# Patient Record
Sex: FEMALE | Race: WHITE | Employment: FULL TIME | URBAN - METROPOLITAN AREA
[De-identification: names, ages, dates, MRNs, and addresses within clinical notes are randomized per-mention and may not be internally consistent; named-entity substitution may affect disease eponyms.]

---

## 2017-04-06 ENCOUNTER — HOSPITAL ENCOUNTER (OUTPATIENT)
Dept: MAMMOGRAPHY | Age: 42
Discharge: HOME | End: 2017-04-06
Attending: INTERNAL MEDICINE | Admitting: INTERNAL MEDICINE

## 2017-04-06 DIAGNOSIS — Z12.31 VISIT FOR SCREENING MAMMOGRAM: ICD-10-CM

## 2017-07-07 ENCOUNTER — TELEPHONE (OUTPATIENT)
Dept: BARIATRICS/WEIGHT MGMT | Age: 42
End: 2017-07-07

## 2017-08-29 ENCOUNTER — TELEPHONE (OUTPATIENT)
Dept: BARIATRICS/WEIGHT MGMT | Age: 42
End: 2017-08-29

## 2017-09-12 ENCOUNTER — OFFICE VISIT (OUTPATIENT)
Dept: BARIATRICS/WEIGHT MGMT | Age: 42
End: 2017-09-12

## 2017-09-12 VITALS
HEIGHT: 63 IN | SYSTOLIC BLOOD PRESSURE: 127 MMHG | DIASTOLIC BLOOD PRESSURE: 74 MMHG | HEART RATE: 69 BPM | RESPIRATION RATE: 16 BRPM | BODY MASS INDEX: 36.29 KG/M2 | WEIGHT: 204.8 LBS

## 2017-09-12 DIAGNOSIS — K21.9 CHRONIC GERD: ICD-10-CM

## 2017-09-12 DIAGNOSIS — Z01.818 PRE-OPERATIVE CLEARANCE: ICD-10-CM

## 2017-09-12 DIAGNOSIS — F32.A DEPRESSION, UNSPECIFIED DEPRESSION TYPE: ICD-10-CM

## 2017-09-12 PROCEDURE — 99205 OFFICE O/P NEW HI 60 MIN: CPT | Performed by: SURGERY

## 2017-09-12 RX ORDER — CALCIUM CARBONATE 200(500)MG
15 TABLET,CHEWABLE ORAL AS NEEDED
COMMUNITY

## 2017-09-21 ENCOUNTER — TELEPHONE (OUTPATIENT)
Dept: BARIATRICS/WEIGHT MGMT | Age: 42
End: 2017-09-21

## 2017-10-03 ENCOUNTER — TELEPHONE (OUTPATIENT)
Dept: BARIATRICS/WEIGHT MGMT | Age: 42
End: 2017-10-03

## 2017-10-03 NOTE — TELEPHONE ENCOUNTER
Patient called with questions about bill received. States she called the billing office and they advised her to call our office. I will look into why patient has a bill.

## 2017-10-04 ENCOUNTER — TELEPHONE (OUTPATIENT)
Dept: SURGERY | Age: 42
End: 2017-10-04

## 2017-10-04 NOTE — TELEPHONE ENCOUNTER
Patient called again upset about her bill. She would like a call tomorrow after 6:00 pm on her cell [phone].

## 2018-12-01 ENCOUNTER — HOSPITAL ENCOUNTER (OUTPATIENT)
Dept: WOMENS IMAGING | Age: 43
Discharge: HOME | End: 2018-12-01
Payer: COMMERCIAL

## 2018-12-01 DIAGNOSIS — Z12.31 ENCOUNTER FOR SCREENING MAMMOGRAM FOR BREAST CANCER: ICD-10-CM

## 2018-12-01 PROCEDURE — 77067 SCR MAMMO BI INCL CAD: CPT

## 2019-05-06 LAB
BASOPHILS ABSOLUTE: 0.1 K/UL (ref 0–0.2)
BASOPHILS RELATIVE PERCENT: 0.9 %
EOSINOPHILS ABSOLUTE: 0.4 K/UL (ref 0–0.6)
EOSINOPHILS RELATIVE PERCENT: 4.6 %
HCT VFR BLD CALC: 41.1 % (ref 36–48)
HEMOGLOBIN: 13.6 G/DL (ref 12–16)
LYMPHOCYTES ABSOLUTE: 3.6 K/UL (ref 1–5.1)
LYMPHOCYTES RELATIVE PERCENT: 42.5 %
MCH RBC QN AUTO: 29.2 PG (ref 26–34)
MCHC RBC AUTO-ENTMCNC: 33.1 G/DL (ref 31–36)
MCV RBC AUTO: 88 FL (ref 80–100)
MONOCYTES ABSOLUTE: 0.6 K/UL (ref 0–1.3)
MONOCYTES RELATIVE PERCENT: 6.8 %
NEUTROPHILS ABSOLUTE: 3.9 K/UL (ref 1.7–7.7)
NEUTROPHILS RELATIVE PERCENT: 45.2 %
PDW BLD-RTO: 13.5 % (ref 12.4–15.4)
PLATELET # BLD: 238 K/UL (ref 135–450)
PMV BLD AUTO: 10.1 FL (ref 5–10.5)
RBC # BLD: 4.67 M/UL (ref 4–5.2)
WBC # BLD: 8.5 K/UL (ref 4–11)

## 2019-05-06 NOTE — PROGRESS NOTES
[facility] time____0800________        Surgery time__0900__________    Take the following medications with a sip of water:    Do not eat or drink anything after 12:00 midnight prior to your surgery. This includes water chewing gum, mints and ice chips. You may brush your teeth and gargle the morning of your surgery, but do not swallow the water      You may be asked to stop blood thinners such as Coumadin, Plavix, Fragmin, Lovenox, etc., or any anti-inflammatories such as:  Aspirin, Ibuprofen, Advil, Naproxen prior to your surgery. We also ask that you stop any OTC medications such as fish oil, vitamin E, glucosamine, garlic, Multivitamins, COQ 10, etc.    We ask that you do not smoke 24 hours prior to surgery  We ask that you do not  drink any alcoholic beverages 24 hours prior to surgery     You must make arrangements for a responsible adult to take you home after your surgery. For your safety you will not be allowed to leave alone or drive yourself home. Your surgery will be cancelled if you do not have a ride home. Also for your safety, it is strongly suggested that someone stay with you the first 24 hours after your surgery. A parent or legal guardian must accompany a child scheduled for surgery and plan to stay at the hospital until the child is discharged. Please do not bring other children with you. For your comfort, please wear simple loose fitting clothing to the hospital.  Please do not bring valuables. Do not wear any make-up or nail polish on your fingers or toes      For your safety, please do not wear any jewelry or body piercing's on the day of surgery. All jewelry must be removed. If you have dentures, they will be removed before going to operating room. For your convenience, we will provide you with a container. If you wear contact lenses or glasses, they will be removed, please bring a case for them.      If you have a living will and a durable power of  for healthcare, please bring in a copy. As part of our patient safety program to minimize surgical site infections, we ask you to do the following:    · Please notify your surgeon if you develop any illness between         now and the  day of your surgery. · This includes a cough, cold, fever, sore throat, nausea,         or vomiting, and diarrhea, etc.  ·  Please notify your surgeon if you experience dizziness, shortness         of breath or blurred vision between now and the time of your surgery. You may shower the night before surgery or the morning of   your surgery with an antibacterial soap. You will need to bring a photo ID and insurance card    [facility] has an onsite pharmacy, would you like to utilize our pharmacy     If you will be staying overnight and use a C-pap machine, please bring   your C-pap to hospital     Our goal is to provide you with excellent care, therefore, visitors will be limited to two(2) in the room at a time so that we may focus on providing this care for you. Please contact pre-admission testing if you have any further questions. [facility] phone number:  [phone]  Please note these are generalized instructions for all surgical cases, you may be provided with more specific instructions according to your surgery.

## 2019-05-08 ENCOUNTER — HOSPITAL ENCOUNTER (OUTPATIENT)
Age: 44
Setting detail: OUTPATIENT SURGERY
Discharge: HOME | End: 2019-05-08
Attending: INTERNAL MEDICINE | Admitting: INTERNAL MEDICINE
Payer: COMMERCIAL

## 2019-05-08 ENCOUNTER — ANESTHESIA EVENT (OUTPATIENT)
Dept: ENDOSCOPY | Age: 44
End: 2019-05-08
Payer: COMMERCIAL

## 2019-05-08 ENCOUNTER — ANESTHESIA (OUTPATIENT)
Dept: ENDOSCOPY | Age: 44
End: 2019-05-08
Payer: COMMERCIAL

## 2019-05-08 VITALS
HEART RATE: 59 BPM | RESPIRATION RATE: 18 BRPM | OXYGEN SATURATION: 99 % | TEMPERATURE: 97.6 F | BODY MASS INDEX: 37.36 KG/M2 | WEIGHT: 203 LBS | DIASTOLIC BLOOD PRESSURE: 60 MMHG | SYSTOLIC BLOOD PRESSURE: 116 MMHG | HEIGHT: 62 IN

## 2019-05-08 VITALS — DIASTOLIC BLOOD PRESSURE: 74 MMHG | OXYGEN SATURATION: 97 % | SYSTOLIC BLOOD PRESSURE: 126 MMHG

## 2019-05-08 DIAGNOSIS — K21.00 REFLUX ESOPHAGITIS: ICD-10-CM

## 2019-05-08 DIAGNOSIS — K92.0 HEMATEMESIS, PRESENCE OF NAUSEA NOT SPECIFIED: ICD-10-CM

## 2019-05-08 PROCEDURE — 2709999900 HC NON-CHARGEABLE SUPPLY: Performed by: INTERNAL MEDICINE

## 2019-05-08 PROCEDURE — 3609012400 HC EGD TRANSORAL BIOPSY SINGLE/MULTIPLE: Performed by: INTERNAL MEDICINE

## 2019-05-08 PROCEDURE — 2580000003 HC RX 258: Performed by: NURSE ANESTHETIST, CERTIFIED REGISTERED

## 2019-05-08 PROCEDURE — 3700000001 HC ADD 15 MINUTES (ANESTHESIA): Performed by: INTERNAL MEDICINE

## 2019-05-08 PROCEDURE — 6360000002 HC RX W HCPCS: Performed by: NURSE ANESTHETIST, CERTIFIED REGISTERED

## 2019-05-08 PROCEDURE — 7100000011 HC PHASE II RECOVERY - ADDTL 15 MIN: Performed by: INTERNAL MEDICINE

## 2019-05-08 PROCEDURE — 3700000000 HC ANESTHESIA ATTENDED CARE: Performed by: INTERNAL MEDICINE

## 2019-05-08 PROCEDURE — 7100000010 HC PHASE II RECOVERY - FIRST 15 MIN: Performed by: INTERNAL MEDICINE

## 2019-05-08 PROCEDURE — 2580000003 HC RX 258: Performed by: ANESTHESIOLOGY

## 2019-05-08 PROCEDURE — 88305 TISSUE EXAM BY PATHOLOGIST: CPT

## 2019-05-08 RX ORDER — SODIUM CHLORIDE 0.9 % (FLUSH) 0.9 %
10 SYRINGE (ML) INJECTION EVERY 12 HOURS SCHEDULED
Status: DISCONTINUED | OUTPATIENT
Start: 2019-05-08 | End: 2019-05-08 | Stop reason: SDUPTHER

## 2019-05-08 RX ORDER — SODIUM CHLORIDE 0.9 % (FLUSH) 0.9 %
10 SYRINGE (ML) INJECTION PRN
Status: DISCONTINUED | OUTPATIENT
Start: 2019-05-08 | End: 2019-05-08 | Stop reason: SDUPTHER

## 2019-05-08 RX ORDER — OXYCODONE HYDROCHLORIDE 5 MG/1
5 TABLET ORAL
Status: DISCONTINUED | OUTPATIENT
Start: 2019-05-08 | End: 2019-05-08 | Stop reason: HOSPADM

## 2019-05-08 RX ORDER — SODIUM CHLORIDE 9 MG/ML
INJECTION, SOLUTION INTRAVENOUS CONTINUOUS PRN
Status: DISCONTINUED | OUTPATIENT
Start: 2019-05-08 | End: 2019-05-08 | Stop reason: SDUPTHER

## 2019-05-08 RX ORDER — SODIUM CHLORIDE 0.9 % (FLUSH) 0.9 %
10 SYRINGE (ML) INJECTION PRN
Status: DISCONTINUED | OUTPATIENT
Start: 2019-05-08 | End: 2019-05-08 | Stop reason: HOSPADM

## 2019-05-08 RX ORDER — SODIUM CHLORIDE 0.9 % (FLUSH) 0.9 %
10 SYRINGE (ML) INJECTION EVERY 12 HOURS SCHEDULED
Status: DISCONTINUED | OUTPATIENT
Start: 2019-05-08 | End: 2019-05-08 | Stop reason: HOSPADM

## 2019-05-08 RX ORDER — MORPHINE SULFATE 4 MG/ML
3 INJECTION, SOLUTION INTRAMUSCULAR; INTRAVENOUS
Status: DISCONTINUED | OUTPATIENT
Start: 2019-05-08 | End: 2019-05-08 | Stop reason: HOSPADM

## 2019-05-08 RX ORDER — SODIUM CHLORIDE 9 MG/ML
INJECTION, SOLUTION INTRAVENOUS CONTINUOUS
Status: DISCONTINUED | OUTPATIENT
Start: 2019-05-08 | End: 2019-05-08 | Stop reason: HOSPADM

## 2019-05-08 RX ORDER — ONDANSETRON 2 MG/ML
4 INJECTION INTRAMUSCULAR; INTRAVENOUS ONCE
Status: DISCONTINUED | OUTPATIENT
Start: 2019-05-08 | End: 2019-05-08 | Stop reason: HOSPADM

## 2019-05-08 RX ORDER — SODIUM CHLORIDE, SODIUM LACTATE, POTASSIUM CHLORIDE, CALCIUM CHLORIDE 600; 310; 30; 20 MG/100ML; MG/100ML; MG/100ML; MG/100ML
INJECTION, SOLUTION INTRAVENOUS CONTINUOUS
Status: DISCONTINUED | OUTPATIENT
Start: 2019-05-08 | End: 2019-05-08 | Stop reason: ALTCHOICE

## 2019-05-08 RX ORDER — PROPOFOL 10 MG/ML
INJECTION, EMULSION INTRAVENOUS PRN
Status: DISCONTINUED | OUTPATIENT
Start: 2019-05-08 | End: 2019-05-08 | Stop reason: SDUPTHER

## 2019-05-08 RX ORDER — HYDRALAZINE HYDROCHLORIDE 20 MG/ML
10 INJECTION INTRAMUSCULAR; INTRAVENOUS EVERY 10 MIN PRN
Status: DISCONTINUED | OUTPATIENT
Start: 2019-05-08 | End: 2019-05-08 | Stop reason: HOSPADM

## 2019-05-08 RX ORDER — MEPERIDINE HYDROCHLORIDE 25 MG/ML
12.5 INJECTION INTRAMUSCULAR; INTRAVENOUS; SUBCUTANEOUS EVERY 5 MIN PRN
Status: DISCONTINUED | OUTPATIENT
Start: 2019-05-08 | End: 2019-05-08 | Stop reason: HOSPADM

## 2019-05-08 RX ORDER — ONDANSETRON 2 MG/ML
4 INJECTION INTRAMUSCULAR; INTRAVENOUS
Status: DISCONTINUED | OUTPATIENT
Start: 2019-05-08 | End: 2019-05-08 | Stop reason: HOSPADM

## 2019-05-08 RX ADMIN — SODIUM CHLORIDE: 9 INJECTION, SOLUTION INTRAVENOUS at 07:29

## 2019-05-08 RX ADMIN — SODIUM CHLORIDE: 0.9 INJECTION, SOLUTION INTRAVENOUS at 07:22

## 2019-05-08 RX ADMIN — PROPOFOL 50 MG: 10 INJECTION, EMULSION INTRAVENOUS at 07:38

## 2019-05-08 RX ADMIN — PROPOFOL 100 MG: 10 INJECTION, EMULSION INTRAVENOUS at 07:36

## 2019-05-08 ASSESSMENT — PAIN - FUNCTIONAL ASSESSMENT
PAIN_FUNCTIONAL_ASSESSMENT: ACTIVITIES ARE NOT PREVENTED
PAIN_FUNCTIONAL_ASSESSMENT: ACTIVITIES ARE NOT PREVENTED
PAIN_FUNCTIONAL_ASSESSMENT: 0-10
PAIN_FUNCTIONAL_ASSESSMENT: ACTIVITIES ARE NOT PREVENTED
PAIN_FUNCTIONAL_ASSESSMENT: ACTIVITIES ARE NOT PREVENTED

## 2019-05-08 ASSESSMENT — PAIN DESCRIPTION - PAIN TYPE
TYPE: CHRONIC PAIN

## 2019-05-08 ASSESSMENT — PAIN DESCRIPTION - DESCRIPTORS
DESCRIPTORS: DULL

## 2019-05-08 ASSESSMENT — PAIN SCALES - GENERAL
PAINLEVEL_OUTOF10: 0
PAINLEVEL_OUTOF10: 2
PAINLEVEL_OUTOF10: 1

## 2019-05-08 ASSESSMENT — LIFESTYLE VARIABLES: SMOKING_STATUS: 0

## 2019-05-08 ASSESSMENT — PAIN DESCRIPTION - PROGRESSION
CLINICAL_PROGRESSION: NOT CHANGED
CLINICAL_PROGRESSION: GRADUALLY IMPROVING
CLINICAL_PROGRESSION: GRADUALLY IMPROVING

## 2019-05-08 ASSESSMENT — PAIN DESCRIPTION - ORIENTATION
ORIENTATION: RIGHT;UPPER

## 2019-05-08 ASSESSMENT — PAIN DESCRIPTION - LOCATION
LOCATION: ABDOMEN

## 2019-05-08 ASSESSMENT — PAIN DESCRIPTION - FREQUENCY
FREQUENCY: CONTINUOUS

## 2019-05-08 NOTE — H&P
[facility]   Pre-operative History and Physical    Patient: Tracie Burr  : 1975  Acct#: [de-identified]    History Obtained From: electronic medical record    HISTORY OF PRESENT ILLNESS  Procedure:EGD  Indications:GERD and ulcer disease  Past Medical History:        Diagnosis Date    Reflux      Past Surgical History:        Procedure Laterality Date    ABDOMINAL EXPLORATION SURGERY      BREAST REDUCTION SURGERY       SECTION      CHOLECYSTECTOMY      HYSTERECTOMY      OVARY REMOVAL      UPPER GASTROINTESTINAL ENDOSCOPY  7/15/13    WITH BIOPSY AND DILITATION    WISDOM TOOTH EXTRACTION       Medications prior to admission:   Prior to Admission medications    Medication Sig Start Date End Date Taking? Authorizing Provider   Dexlansoprazole (DEXILANT PO) Take by mouth   Yes Historical Provider, MD   calcium carbonate (TUMS) 500 MG chewable tablet Take 15 tablets by mouth daily   Yes Historical Provider, MD   Multiple Vitamins-Calcium (VIACTIV MULTI-VITAMIN PO) Take 1 tablet by mouth daily   Yes Historical Provider, MD     Allergies:   Patient has no known allergies. Social History     Socioeconomic History    Marital status:      Spouse name: Not on file    Number of children: Not on file    Years of education: Not on file    Highest education level: Not on file   Occupational History    Not on file   Social Needs    Financial resource strain: Not on file    Food insecurity:     Worry: Not on file     Inability: Not on file    Transportation needs:     Medical: Not on file     Non-medical: Not on file   Tobacco Use    Smoking status: Never Smoker    Smokeless tobacco: Never Used   Substance and Sexual Activity    Alcohol use:  Yes    Drug use: Not on file    Sexual activity: Not on file   Lifestyle    Physical activity:     Days per week: Not on file     Minutes per session: Not on file    Stress: Not on file   Relationships    Social connections:     Talks on phone: Not on file     Gets together: Not on file     Attends Sikh service: Not on file     Active member of club or organization: Not on file     Attends meetings of clubs or organizations: Not on file     Relationship status: Not on file    Intimate partner violence:     Fear of current or ex partner: Not on file     Emotionally abused: Not on file     Physically abused: Not on file     Forced sexual activity: Not on file   Other Topics Concern    Not on file   Social History Narrative    Not on file     Family History   Problem Relation Age of Onset    Heart Disease Mother     Cancer Mother     Coronary Art Dis Mother     Diabetes Mother     Depression Mother     COPD Mother     Asthma Mother     Heart Disease Sister     Coronary Art Dis Sister     Diabetes Sister     Depression Sister     Kidney Disease Sister     Arthritis Sister     Glaucoma Sister     Diabetes Brother     High Blood Pressure Brother     Stroke Brother     Glaucoma Brother     High Blood Pressure Father     Stroke Father     Clotting Disorder Father     COPD Father     Asthma Father     Arthritis Father     Glaucoma Father     Diabetes Maternal Grandmother     Arthritis Maternal Grandmother     Diabetes Maternal Grandfather     COPD Maternal Grandfather     Arthritis Maternal Grandfather     High Blood Pressure Paternal Grandmother     Diabetes Paternal Grandmother     Arthritis Paternal Grandmother     High Blood Pressure Paternal Grandfather     Diabetes Paternal Grandfather     Arthritis Paternal Grandfather     Hypertension Brother     Coronary Art Dis Brother     Diabetes Brother          PHYSICAL EXAM:      /74   Pulse 75   Temp 97.8 °F (36.6 °C) (Tympanic)   Resp 18   Ht 5' 1.5\" (1.562 m)   Wt 203 lb (92.1 kg)   SpO2 99%   BMI 37.74 kg/m²  I        Heart:normal    Lungs: normal    Abdomen: normal      ASA Grade:  See anesthesia note      ASSESSMENT AND PLAN:    1.   Procedure options, risks and benefits reviewed with patient and expresses understanding.

## 2019-05-08 NOTE — PROCEDURES
[facility]  Endoscopy Note    Patient: Verónica Navarro  : 1975  Acct#: [de-identified]    Procedure: Esophagogastroduodenoscopy with biopsy    Date:  2019     Surgeon:  Jose Conde MD    Referring Physician:  Merlinda Mais    Preoperative Diagnosis:  GERD    Postoperative Diagnosis:  Possible Neri's and gastritis    Anesthesia: see anesthesia note. Indications: This is a 53y.o. year old female who presents today with New-onset dyspepsia. Description of Procedure:  Informed consent was obtained from the patient after explanation of indications, benefits and possible risks and complications of the procedure. The patient was then taken to the endoscopy suite, placed in the left lateral decubitus position and the above IV sedation was administrered. The Olympus videoendoscope was placed in the patient's mouth and under direct visualization passed into the esophagus. Visualization of the esophagus demonstrated possible Neri's that was biopsied. .     The scope was then advanced into the stomach. Visualization of the gastric body and antrum demonstrated gastritis. The antrum was biopsied. .  A retroflexed exam of the gastric cardia and fundus demonstrated hiatal hernia. .  The pylorus was patent and the scope was advanced into the duodenum. Visualization of the duodenal bulb demonstrated normal..  The second portion of the duodenum demonstrated normal. The duodenum was biopsied. .    The scope was then withdrawn back into the stomach, it was decompressed, and the scope was completely withdrawn. The patient tolerated the procedure well and was taken to the post anesthesia care unit in good condition. Estimated Blood loss:  Minimal.    Impression: Possible Neri's and gastritis. Recommendations:Await pathology. Continue PPI.     Jose Conde MD  [facility]

## 2019-05-08 NOTE — ANESTHESIA PRE PROCEDURE
Department of Anesthesiology  Preprocedure Note       Name:  Janneth Drummond   Age:  53 y.o.  :  1975                                          MRN:  [id number]         Date:  2019      Surgeon: Pineda Ann):  Chandra Castro MD    Procedure: EGD ESOPHAGOGASTRODUODENOSCOPY (N/A )    Medications prior to admission:   Prior to Admission medications    Medication Sig Start Date End Date Taking?  Authorizing Provider   Dexlansoprazole (DEXILANT PO) Take by mouth   Yes Historical Provider, MD   calcium carbonate (TUMS) 500 MG chewable tablet Take 15 tablets by mouth daily   Yes Historical Provider, MD   Multiple Vitamins-Calcium (VIACTIV MULTI-VITAMIN PO) Take 1 tablet by mouth daily   Yes Historical Provider, MD       Current medications:    Current Facility-Administered Medications   Medication Dose Route Frequency Provider Last Rate Last Dose    sodium chloride flush 0.9 % injection 10 mL  10 mL Intravenous 2 times per day Cecille Monge MD        sodium chloride flush 0.9 % injection 10 mL  10 mL Intravenous PRN Cecille Monge MD        ondansetron (ZOFRAN) injection 4 mg  4 mg Intravenous Once Cecille Monge MD        0.9 % sodium chloride infusion   Intravenous Continuous Cecille Monge MD           Allergies:  No Known Allergies    Problem List:    Patient Active Problem List   Diagnosis Code    Chronic GERD K21.9    Depression F32.9    Obesity, Class II, BMI 35-39.9, with comorbidity [id number]       Past Medical History:        Diagnosis Date    Reflux        Past Surgical History:        Procedure Laterality Date    ABDOMINAL EXPLORATION SURGERY      BREAST REDUCTION SURGERY       SECTION      CHOLECYSTECTOMY      HYSTERECTOMY      OVARY REMOVAL      UPPER GASTROINTESTINAL ENDOSCOPY  7/15/13    WITH BIOPSY AND DILITATION    WISDOM TOOTH EXTRACTION         Social History:    Social History     Tobacco Use    Smoking status: Never Smoker    Smokeless tobacco: Never Used   Substance Use Topics    Alcohol use: Yes                                Counseling given: Not Answered      Vital Signs (Current):   Vitals:    05/06/19 1524 05/08/19 0716   BP:  114/74   Pulse:  75   Resp:  18   Temp:  97.8 °F (36.6 °C)   TempSrc:  Tympanic   SpO2:  99%   Weight: 201 lb (91.2 kg) 203 lb (92.1 kg)   Height: 5' 1.5\" (1.562 m) 5' 1.5\" (1.562 m)                                              BP Readings from Last 3 Encounters:   05/08/19 114/74   09/12/17 127/74   07/15/13 109/74       NPO Status:                                                                                 BMI:   Wt Readings from Last 3 Encounters:   05/08/19 203 lb (92.1 kg)   09/12/17 204 lb 12.8 oz (92.9 kg)   07/15/13 203 lb 8 oz (92.3 kg)     Body mass index is 37.74 kg/m². CBC:   Lab Results   Component Value Date    WBC 8.5 05/06/2019    RBC 4.67 05/06/2019    HGB 13.6 05/06/2019    HCT 41.1 05/06/2019    MCV 88.0 05/06/2019    RDW 13.5 05/06/2019     05/06/2019       CMP: No results found for: NA, K, CL, CO2, BUN, CREATININE, GFRAA, AGRATIO, LABGLOM, GLUCOSE, PROT, CALCIUM, BILITOT, ALKPHOS, AST, ALT    POC Tests: No results for input(s): POCGLU, POCNA, POCK, POCCL, POCBUN, POCHEMO, POCHCT in the last 72 hours.     Coags: No results found for: PROTIME, INR, APTT    HCG (If Applicable): No results found for: PREGTESTUR, PREGSERUM, HCG, HCGQUANT     ABGs: No results found for: PHART, PO2ART, KED4FCJ, UNK9HII, BEART, T9FDBSOZ     Type & Screen (If Applicable):  No results found for: LABABO, [address]    Anesthesia Evaluation  Patient summary reviewed  Airway: Mallampati: I  TM distance: >3 FB   Neck ROM: full  Mouth opening: > = 3 FB Dental: normal exam         Pulmonary: breath sounds clear to auscultation      (-) COPD, asthma, recent URI and not a current smoker                           Cardiovascular:  Exercise tolerance: good (>4 METS),       (-) pacemaker, hypertension, past MI, CABG/stent, dysrhythmias,  angina and

## 2019-05-08 NOTE — ANESTHESIA POSTPROCEDURE EVALUATION
Department of Anesthesiology  Postprocedure Note    Patient: Bear Coles  MRN: [id number]  [place]: 1975  Date of evaluation: 5/8/2019  Time:  8:06 AM     Procedure Summary     Date:  05/08/19 Room / Location:  [facility] ENDO 02 / [facility] ENDOSCOPY    Anesthesia Start:  2584 Anesthesia Stop:  1537    Procedure:  EGD BIOPSY (N/A ) Diagnosis:       Hematemesis, presence of nausea not specified      Reflux esophagitis      (HEMATEMESIS, REFLUX)    Surgeon:  Benjamin Odonnell MD Responsible Provider:  Kaur De Jesus MD    Anesthesia Type:  TIVA ASA Status:  2          Anesthesia Type: TIVA    Arnaud Phase I: Arnaud Score: 10    Arnaud Phase II: Arnaud Score: 10    Last vitals: Reviewed and per EMR flowsheets.        Anesthesia Post Evaluation    Patient location during evaluation: PACU  Patient participation: complete - patient participated  Level of consciousness: awake  Airway patency: patent  Nausea & Vomiting: no nausea  Complications: no  Cardiovascular status: hemodynamically stable  Respiratory status: acceptable  Hydration status: euvolemic

## 2022-06-03 ENCOUNTER — HOSPITAL ENCOUNTER (OUTPATIENT)
Dept: MAMMOGRAPHY | Age: 47
Discharge: HOME | End: 2022-06-03
Payer: COMMERCIAL

## 2022-06-03 VITALS — WEIGHT: 170 LBS | BODY MASS INDEX: 31.28 KG/M2 | HEIGHT: 62 IN

## 2022-06-03 DIAGNOSIS — Z12.31 VISIT FOR SCREENING MAMMOGRAM: ICD-10-CM

## 2022-06-03 PROCEDURE — 77067 SCR MAMMO BI INCL CAD: CPT

## 2022-07-08 ENCOUNTER — HOSPITAL ENCOUNTER (OUTPATIENT)
Dept: WOMENS IMAGING | Age: 47
Discharge: HOME | End: 2022-07-08
Payer: COMMERCIAL

## 2022-07-08 DIAGNOSIS — R92.8 ABNORMAL MAMMOGRAM: ICD-10-CM

## 2022-07-08 PROCEDURE — G0279 TOMOSYNTHESIS, MAMMO: HCPCS

## 2022-07-08 NOTE — PROGRESS NOTES
This RN spoke to patient regarding recommendation for breast biopsy. RN and patient discussed medical history, allergies, and current medication list. Biopsy procedure explained to patient and printed education and instructions were provided as well. Patient denies any further questions at this time. Reviewed pre and post biopsy instructions/information with patient. Pt verbalized understanding. Requesting order for biopsy from Provider at this time.

## 2022-07-13 ENCOUNTER — HOSPITAL ENCOUNTER (OUTPATIENT)
Dept: WOMENS IMAGING | Age: 47
Discharge: HOME | End: 2022-07-13
Payer: COMMERCIAL

## 2022-07-13 VITALS — SYSTOLIC BLOOD PRESSURE: 146 MMHG | DIASTOLIC BLOOD PRESSURE: 47 MMHG | OXYGEN SATURATION: 99 % | HEART RATE: 71 BPM

## 2022-07-13 DIAGNOSIS — R92.8 ABNORMAL MAMMOGRAM: ICD-10-CM

## 2022-07-13 PROCEDURE — 76098 X-RAY EXAM SURGICAL SPECIMEN: CPT

## 2022-07-13 PROCEDURE — 88305 TISSUE EXAM BY PATHOLOGIST: CPT

## 2022-07-13 PROCEDURE — 88341 IMHCHEM/IMCYTCHM EA ADD ANTB: CPT

## 2022-07-13 PROCEDURE — 19082 BX BREAST ADD LESION STRTCTC: CPT

## 2022-07-13 PROCEDURE — 2720000010 MAM STEREO BREAST BX W LOC DEVICE 1ST LESION LEFT

## 2022-07-13 PROCEDURE — 88360 TUMOR IMMUNOHISTOCHEM/MANUAL: CPT

## 2022-07-13 PROCEDURE — 88342 IMHCHEM/IMCYTCHM 1ST ANTB: CPT

## 2022-07-13 PROCEDURE — 77065 DX MAMMO INCL CAD UNI: CPT

## 2022-07-13 ASSESSMENT — PAIN SCALES - GENERAL
PAINLEVEL_OUTOF10: 0
PAINLEVEL_OUTOF10: 0

## 2022-07-13 ASSESSMENT — PAIN SCALES - WONG BAKER: WONGBAKER_NUMERICALRESPONSE: 0

## 2022-07-13 NOTE — PROGRESS NOTES
Breast Biopsy Nursing Note    NAME:  Violette Diaz OF BIRTH:  1975 GENDER: female  MEDICAL RECORD NUMBER:  [id number]  DATE:  7/13/2022     Breast Biopsy completed by .  Expiration date site marker checked immediately prior to use.  Package intact prior to use and no damage noted.  Site marker was removed from protective sterile packaging by physician.  Site marker was placed by physician into left     breast/s.  Hemostasis achieved via site pressure; Biopsy site cleansed with alcohol   Steri-strips applied along with small amount of bacitracin-neomycin polymixin then 2X2 tegaderm    Breast Biopsy tissue was placed in proper container/s and labeled.  Breast Biopsy tissue was taken to Pathology for evaluation. Procedural Pain:  0  / 10     Post Procedural Pain:  0 / 10     Procedure well tolerated. Pt stable and alert and oriented x 3 upon discharge. Ice pack placed over biopsy site. Pt given post-biopsy education and all questions answered. Pt has NN contact info.        Electronically signed by Franky Clark RN on 7/13/2022 at 9:40 AM

## 2022-07-19 ENCOUNTER — TELEPHONE (OUTPATIENT)
Dept: BREAST CENTER | Age: 47
End: 2022-07-19

## 2022-07-19 ENCOUNTER — INITIAL CONSULT (OUTPATIENT)
Dept: BREAST CENTER | Age: 47
End: 2022-07-19
Payer: COMMERCIAL

## 2022-07-19 ENCOUNTER — HOSPITAL ENCOUNTER (OUTPATIENT)
Dept: ULTRASOUND IMAGING | Age: 47
Discharge: HOME | End: 2022-07-19
Payer: COMMERCIAL

## 2022-07-19 VITALS
DIASTOLIC BLOOD PRESSURE: 92 MMHG | WEIGHT: 174 LBS | OXYGEN SATURATION: 98 % | BODY MASS INDEX: 32.02 KG/M2 | HEART RATE: 80 BPM | HEIGHT: 62 IN | SYSTOLIC BLOOD PRESSURE: 132 MMHG

## 2022-07-19 DIAGNOSIS — C50.812 MALIGNANT NEOPLASM OF OVERLAPPING SITES OF LEFT BREAST IN FEMALE, ESTROGEN RECEPTOR POSITIVE (HCC): Primary | ICD-10-CM

## 2022-07-19 DIAGNOSIS — Z17.0 MALIGNANT NEOPLASM OF OVERLAPPING SITES OF LEFT BREAST IN FEMALE, ESTROGEN RECEPTOR POSITIVE (HCC): Primary | ICD-10-CM

## 2022-07-19 DIAGNOSIS — R92.8 ABNORMAL MAMMOGRAM: ICD-10-CM

## 2022-07-19 PROCEDURE — 76642 ULTRASOUND BREAST LIMITED: CPT

## 2022-07-19 PROCEDURE — 99205 OFFICE O/P NEW HI 60 MIN: CPT | Performed by: SURGERY

## 2022-07-19 NOTE — TELEPHONE ENCOUNTER
Breast History:  History of Previous Breast Biopsy:-yes  Self Breast Exams Completed:-yes  Family History of Breast Cancer:-yes PGM, doesn't know age of dx or death    Family History of Other Cancers:-no   Ashkenazi Alevism Decent:no  Bra Size: 39 DD    Gyne History:  : 1   Para: 3  Age of Menarche: 4 years  Age of Menopause: 26 years   Age of first live Birth: 25 years  History of Hysterectomy / GARRY-BSO: 26  History of OCP's 2 years  Family History or personal history of Ovarian Cancer: no

## 2022-07-19 NOTE — PROGRESS NOTES
Subjective:      Patient ID: Liliana Riley is a 50 y.o. female. HPI   Chief Complaint   Patient presents with    Surgical Consult     Referred by Dr. Geo Simon, breast cancer     Patient had a recent screening mammogram showing a large area of left breast calcifications 6:00. Stereotactic biopsy of the area showed invasive ductal carcinoma, ER/NV/Her2 positive. She has not felt any breast masses, no breast pain or nipple discharge.      Breast History:  History of Previous Breast Biopsy:-yes  Self Breast Exams Completed:-yes  Family History of Breast Cancer:-yes PGM, doesn't know age of dx or death    Family History of Other Cancers:-no   Ashkenazi Adventism Decent:no  Bra Size: 39 DD     Gyne History:  : 1   Para: 1  Age of Menarche: 4 years  Age of Menopause: 26 years  Age of first live Birth: 25 years  History of Hysterectomy / GARRY-BSO: 26  History of OCP's 2 years  Family History or personal history of Ovarian Cancer: no     Past Medical History:   Diagnosis Date    Reflux        Past Surgical History:   Procedure Laterality Date    ABDOMINAL EXPLORATION SURGERY      BREAST REDUCTION SURGERY       SECTION      CHOLECYSTECTOMY      HYSTERECTOMY (CERVIX STATUS UNKNOWN)      PATEL STEREO BREAST BX ADD LESION LEFT Left 2022    PATEL STEREO BREAST BX ADD LESION LEFT 2022 [facility] [address]    PATEL STEROTACTIC LOC BREAST BIOPSY LEFT Left 2022    PATEL STEROTACTIC LOC BREAST BIOPSY LEFT 2022 [facility] [address] REMOVAL      UPPER GASTROINTESTINAL ENDOSCOPY  7/15/13    WITH BIOPSY AND DILITATION    UPPER GASTROINTESTINAL ENDOSCOPY N/A 2019    EGD BIOPSY performed by Franck [facility] at [facility]         Current Outpatient Medications   Medication Sig Dispense Refill    Dexlansoprazole (DEXILANT PO) Take by mouth      calcium carbonate (TUMS) 500 MG chewable tablet Take 15 tablets by mouth daily      Multiple Vitamins-Calcium (VIACTIV MULTI-VITAMIN PO) Take 1 tablet by mouth daily       No current facility-administered medications for this visit. Social History     Socioeconomic History    Marital status:      Spouse name: Not on file    Number of children: Not on file    Years of education: Not on file    Highest education level: Not on file   Occupational History    Not on file   Tobacco Use    Smoking status: Never    Smokeless tobacco: Never   Vaping Use    Vaping Use: Never used   Substance and Sexual Activity    Alcohol use: Yes    Drug use: Not on file    Sexual activity: Not on file   Other Topics Concern    Not on file   Social History Narrative    Not on file     Social Determinants of Health     Financial Resource Strain: Not on file   Food Insecurity: Not on file   Transportation Needs: Not on file   Physical Activity: Not on file   Stress: Not on file   Social Connections: Not on file   Intimate Partner Violence: Not on file   Housing Stability: Not on file       Objective:   Physical Exam    Bilateral breasts - Normal contour, no masses, no nipple or skin changes. Ecchymosis lower left breast post biopsy. No cervical or axillary adenopathy. Assessment:      Diagnosis Orders   1. Malignant neoplasm of overlapping sites of left breast in female, estrogen receptor positive ([address]  US EXTREMITY LEFT NON VASC LIMITED             Plan:     I reviewed her imaging with radiology and findings were discussed with the patient. She has a large area of microcalcifications across the lower left breast extending for at least 8 cm. Biopsy of a cluster anteriorly showed invasive ductal carcinoma. However, the majority of the changes appears to be DCIS, as represented by the posterior biopsy. I talked with oncology, Dr. Leonidas Cortes, and she recommends we proceed with surgery prior to adjuvant therapy. Discussed breast biopsy results with patient.  Reviewed surgical options, including lumpectomy, mastectomy, and mastectomy with

## 2022-07-20 ENCOUNTER — TELEPHONE (OUTPATIENT)
Dept: BREAST CENTER | Age: 47
End: 2022-07-20

## 2022-07-20 NOTE — TELEPHONE ENCOUNTER
Patient called Dr. Kassy Mcgarry office and was disrespectful. She will have to see a different plastic surgeon. Samuel Delgado Also called Dr. Edwards Cluster office doesn't want to take any appt that is offered. I will call patient tomorrow to discuss options and times available. I did referral patient to [facility] to speak to a counselor for her new diagnosis.           Thanks, Keisha Franco

## 2022-07-20 NOTE — TELEPHONE ENCOUNTER
Patient called in very upset, said she was advised to follow  Up with plastics and had a bad experience dealing with  The  for Dr. Muñoz Drought office. She would like someone   To contact her about how to follow up with plactics. Per Dr. Jared Bustillo she would like Tashia reach out to patient and   Try and help her further.

## 2022-07-20 NOTE — TELEPHONE ENCOUNTER
Patient very upset and would like to speak to Dr. Vijay Arellano. Patient said she is not going to Dr. Charanjit Parikh (staff was very rude)  Patient also said, she was thinking of not having surgery.   Please call patient at [phone]

## 2022-07-22 DIAGNOSIS — Z17.0 ESTROGEN RECEPTOR POSITIVE: ICD-10-CM

## 2022-07-22 DIAGNOSIS — C50.812 MALIGNANT NEOPLASM OF OVERLAPPING SITES OF LEFT BREAST IN FEMALE, ESTROGEN RECEPTOR POSITIVE (HCC): Primary | ICD-10-CM

## 2022-07-22 DIAGNOSIS — Z17.0 MALIGNANT NEOPLASM OF OVERLAPPING SITES OF LEFT BREAST IN FEMALE, ESTROGEN RECEPTOR POSITIVE (HCC): Primary | ICD-10-CM

## 2022-08-01 ENCOUNTER — TELEPHONE (OUTPATIENT)
Dept: SURGERY | Age: 47
End: 2022-08-01

## 2022-08-01 NOTE — TELEPHONE ENCOUNTER
Left a message to return my call concerning her missed appt with Dr. Sebastian Tucker.          Thanks , Lesley Esteban

## 2022-08-02 NOTE — TELEPHONE ENCOUNTER
Patient called back , \" I didn't go see Dr. Ema Primrose due to she uses silicon implants. I will call Dr. Dg Gu to see if I can get an appt with him. \"       She wanted a surgery date. Venice Rendon was informed we can not give her a surgery date until she meets with a plastic surgeon. The plastic surgeon and Dr. Jesus Plummer will work together for a surgery date.         Thanks, Nelson Montiel

## 2022-08-08 ENCOUNTER — OFFICE VISIT (OUTPATIENT)
Dept: SURGERY | Age: 47
End: 2022-08-08
Payer: COMMERCIAL

## 2022-08-08 VITALS
DIASTOLIC BLOOD PRESSURE: 73 MMHG | BODY MASS INDEX: 32.02 KG/M2 | HEART RATE: 68 BPM | SYSTOLIC BLOOD PRESSURE: 110 MMHG | WEIGHT: 174 LBS | HEIGHT: 62 IN

## 2022-08-08 DIAGNOSIS — C50.912 MALIGNANT NEOPLASM OF LEFT FEMALE BREAST, UNSPECIFIED ESTROGEN RECEPTOR STATUS, UNSPECIFIED SITE OF BREAST (HCC): Primary | ICD-10-CM

## 2022-08-08 PROCEDURE — 99205 OFFICE O/P NEW HI 60 MIN: CPT | Performed by: SURGERY

## 2022-08-08 NOTE — LETTER
Surgery Schedule Request Form  [facility]  [address] [place] [address], [address]  DATE OF SURGERY: 2022     TIME OF SURGERY: 8:30 am      Confirmation#:__________________        Surgeon Name: Bishop Kamran MD    Phone: [phone]     Fax: [phone]  Co-Case Additional Surgeon: Mendez Cuba MD  Procedure Name: 1) Bilateral breast reconstruction with stage 1 tissue expander placement with Alloderm and creation of Autoderm flaps  CPT CODES (required for scheduling): 34787 & 18933  DIAGNOSIS: C50.912  Breast Cancer    LENGTH OF PROCEDURE: 2 hours  Patient Status: 23 hour observation    Labs Needed:   CBC _x__  PT/PTT_x__ INR __x__  CMP _x__ EKG __x_   Urine Hcg ___            PATIENT NAME: Velma Arthur OF BIRTH: 1975  SEX: female   SS #:   PHONE: [phone] (home) [phone] (work)    Pre-Op to be done by: PCP  Cardiac Clearance Done by: per PCP    Pt Position:  supine  Patient to meet with Anesthesiology prior to surgery: no     Medications to be stopped 5 days before surgery: anticoagulation     Ancef 2 gm IV OCTOR (NOTE:  If patient weight is > 120 kg, Administer 3 gm)  Other Orders: Transexemic acid 1g IV OCTOR; No Decadron; SA    INSURANCE: [employer]                                             SUBSCRIBER NAME: Matheus Art ID:  [id number]                             AUTHORIZATION #: PENDING     PCP: Maine Knife                                        ANESTHESIA:  General    Bishop Kamran MD                             FAX TO: [phone]   QUESTIONS? CALL: [address]   Fax   [phone]            Date Of Procedure: 2022    PATIENT:       Damien Segovia                    :  1975      No Known Allergies    No.   PHYSICIAN ORDERS   HUC/  RN      ORDERS WITH CHECK BOXES MUST BE SELECTED. ALL OTHER ORDERS WILL BE AUTOMATICALLY INITIATED.            Date / Time of Order:   22   3:55 PM Procedure: Bilateral breast reconstruction with stage 1 tissue expander placement with Alloderm and creation of Autoderm flaps       1. Cefazolin (Ancef) 2 gm IV OCTOR   ** NOTE:  If patient weight is > 120 kg, Administer 3 gm         2. ** If PCN allergy, then Clindamycin 600mg IV OCTOR.   ** If prior history of MRSA, Vancomycin 1g IV OCTOR (please check with renal function prior to administration)       3.  Other orders:  Transexemic acid 1g IV OCTOR; No Decadron; SA     Physician / Surgeon:  Kecia Castillo MD  Office Ph:  [phone]       Physician Signature:     9/07

## 2022-08-08 NOTE — PROGRESS NOTES
[facility]    CC: Breast CA     Referring physician: Bob Ramirez MD    HPI: This is a 50 y.o. female with a PMHx as delineated below who presents in consultation for breast reconstruction. She was found to have left breast cancer and desires to proceed with bilateral mastectomy with reconstruction. Plastic surgery was consulted for evaluation and treatment. The specifics of her breast cancer workup / treatment is as follows:     Diagnosis: invasive ductal & Genetic High Risk  Mo/Yr Diagnosed:   Breast Involved:Left  Tumor Size & Grade: T1N0M0  Stage: 1  Alka status: Negative  ER: Positive MT: Positive HER2: Positive    Post Op Plan:  Oncologist: Valery Hoffman MD  Radiation: None    Chemo/Meds: Will need adjuvant chemotherapy  Pregnancy/Miscarriages: 3 / 2    PMHx:   Past Medical History:   Diagnosis Date    Reflux      PSHx:   Past Surgical History:   Procedure Laterality Date    ABDOMINAL EXPLORATION SURGERY      BREAST REDUCTION SURGERY       SECTION      CHOLECYSTECTOMY      HYSTERECTOMY (CERVIX STATUS UNKNOWN)      PATEL STEREO BREAST BX ADD LESION LEFT Left 2022    PATEL STEREO BREAST BX ADD LESION LEFT 2022 [facility] [address]    PATEL STEROTACTIC LOC BREAST BIOPSY LEFT Left 2022    PATEL STEROTACTIC LOC BREAST BIOPSY LEFT 2022 [facility] GASTROINTESTINAL ENDOSCOPY  7/15/13    WITH BIOPSY AND DILITATION    UPPER GASTROINTESTINAL ENDOSCOPY N/A 2019    EGD BIOPSY performed by Elsa Fall MD at [address] [facility]     S/p BBR  Exlap (Neri's esophagus)    Allergies: No Known Allergies  FHx: Past history of breast CA: No  Meds:   Current Outpatient Medications   Medication Sig Dispense Refill    buPROPion HCl (WELLBUTRIN PO) Take 150 Units by mouth at bedtime      Multiple Vitamins-Minerals (ONE-A-DAY WOMENS 50+ PO) Take by mouth daily      Dexlansoprazole (DEXILANT PO) Take by mouth (Patient not taking: Reported on 7/19/2022)      calcium carbonate (TUMS) 500 MG chewable tablet Take 15 tablets by mouth daily       No current facility-administered medications for this visit. SocHx: Smoking: No    ETOH: none    Drug use: No    ROS   Constitutional: Negative for chills and fever. HENT: Negative for congestion, facial swelling, and voice change. Eyes: Negative for photophobia and visual disturbance. Respiratory: Negative for apnea, cough, chest tightness and shortness of breath. Cardiovascular: Negative for chest pain and palpitations. Gastrointestinal: Negative for dysphagia and early satiety. Genitourinary: Negative for difficulty urinating, dysuria, flank pain, frequency and hematuria. Musculoskeletal: Negative for new gait problem, joint swelling and myalgias. Skin: Negative for color change, pallor and rash. Endocrine: negative for tremors, temperature intolerance or polydipsia. Allergic/Immunologic: Negative for new environmental or food allergies. Neurological: Negative for dizziness, seizures, speech difficulty, numbness. Hematological: Negative for adenopathy. Psychiatric/Behavioral: Negative for agitation and confusion. EXAM  /73   Pulse 68   Ht 5' 2\" (1.575 m)   Wt 174 lb (78.9 kg)   BMI 31.83 kg/m²     GEN: NAD, pleasant, obese  CVS: RRR  PULM: No respiratory distress  HEENT: PERRLA/EOMI; hearing appears within normal limits  NECK: Supple with trachea in midline, no masses  EXT: No lymphedema noted  ABD: soft/NT/obese  NEURO: No focal deficits, no obvious CN deficits  BACK: Bilateral latiss muscle intact  BREAST:   Physical Exam    Bra size: 38DD  Desired bra size: Smaller \"much smaller\"; shape matters more than size  Ptosis grade:   R: 3   L: 3  The left breast size is smaller than the right breast.  There were no palpable masses with no palpable lymphadenopathy in the ipsilateral left axilla.    Nipple retraction: No     Left breast sternal notch to nipple: 32.4 cm  Right breast sternal notch to nipple: 33.5 cm    Left breast nipple to inframammary fold: 13.3 cm  Right breast nipple to inframammary fold: 12.9 cm    Left breast width 14 cm  Right breast width 13.8 cm    IMAGING: Reviewed    IMP: 50 y.o. female with breast cancer who presents for discussion regarding reconstruction options  PLAN: Will plan for immediate, bilateral Autoderm TE followed by potential bilateral ARNOLD flaps. Will work with Dr. Raheem Valdovinos and schedule. A discussion regarding surgical options including immediate vs delayed approaches, implant based vs autologous, as well as additional planned revisional surgeries was performed with the patient and family. Multiple autologous donor sites were discussed as well. Clinical photos were obtained. We additionally discussed the FDA recommendations regarding monitoring of silicone implants. The risks, benefits, alternatives, outcomes, personnel involved were discussed. Specifically, the risks including, but not limited to: bleeding that may necessitate transfusion or operation, infection, seroma, reoperation, nonhealing, poor cosmetic outcome, asymmetry, scarring, partial or total flap loss, donor site morbidity, VTE (DVT/PE), and death were discussed. \"A significant amount of time was also allocated to nicotine's effect on wound healing and the patient understands that a sub-optimal wound healing and cosmetic result may occur with continued utilization. All questions were answered in a satisfactory manner according to the patient. Total encounter time: 60 min with > 50% spent with face to face (or vitual) counseling and coordination of care.     Marita Fairchild MD  [facility]  08/08/22

## 2022-08-08 NOTE — Clinical Note
Good candidate to start with TE, but she said she is high risk for breast CA. Do you know what her mutation was? Thanks!  Army Maddox

## 2022-08-09 ENCOUNTER — TELEPHONE (OUTPATIENT)
Dept: SURGERY | Age: 47
End: 2022-08-09

## 2022-08-09 NOTE — TELEPHONE ENCOUNTER
The patient was in the office to see Dr. Lore Latif yesterday. PLAN: Will plan for immediate, bilateral Autoderm TE followed by potential bilateral ARNOLD flaps. Will work with Dr. Jared Bustillo and schedule. I received a surgery letter. I spoke with Chiara Beasley at [facility] ([phone]) to see if CPT Code 29067 or 68058 require pre certification. The codes do require prior authorization. I was directed to the [address] [place] [address] website to print out a form. I will fax a [facility] Medical Predetermination Of Benefits Request Form and clinicals today. I will scan the information that I faxed and the fax success into Epic under the media tab. I lmom for the patient at the home number listed to provide an insurance update. I will work with the office of Dr. Jared Bustillo to find OR time. I will leave this phone note open.

## 2022-08-09 NOTE — TELEPHONE ENCOUNTER
I spoke with the patient at the home number listed. The patient is now scheduled for surgery with  on 9-. The patient is aware of H&P. The patient is scheduled for her post op appointment 9-. I will submit the surgery letter today. I will mail the surgery information and instructions to the patient today. I will close this phone note.

## 2022-08-10 ENCOUNTER — TELEPHONE (OUTPATIENT)
Dept: SURGERY | Age: 47
End: 2022-08-10

## 2022-08-10 DIAGNOSIS — C50.812 MALIGNANT NEOPLASM OF OVERLAPPING SITES OF LEFT BREAST IN FEMALE, ESTROGEN RECEPTOR POSITIVE (HCC): Primary | ICD-10-CM

## 2022-08-10 DIAGNOSIS — Z17.0 MALIGNANT NEOPLASM OF OVERLAPPING SITES OF LEFT BREAST IN FEMALE, ESTROGEN RECEPTOR POSITIVE (HCC): Primary | ICD-10-CM

## 2022-08-10 NOTE — TELEPHONE ENCOUNTER
I returned the patients call mentioned below. The patient is now scheduled for surgery with  on 8-. The patient is scheduled for her H&P 8-. The patient is scheduled for her post op appointment 8-29 -2022. I sent a message to Lelia Washington and asked her to changed the scheduled surgery from 9- to 8-. I will fax the Alloderm order to [facility] today. I will scan the order and the fax success into Epic under the media tab. The patient will  her surgery information and instructions on Monday when she comes in for education. I will close this phone note.

## 2022-08-11 ENCOUNTER — TELEPHONE (OUTPATIENT)
Dept: SURGERY | Age: 47
End: 2022-08-11

## 2022-08-11 RX ORDER — OMEPRAZOLE 40 MG/1
CAPSULE, DELAYED RELEASE ORAL
COMMUNITY
Start: 2022-05-25

## 2022-08-11 NOTE — PROGRESS NOTES
Place patient label inside box (if no patient label, complete below)  Name:  :  MR#:     Flaquita Rodriguezer / PROCEDURE  I (we)Moustapha Poster (Patient Name) authorize DR Fernie Matias (Provider / Keri Santos) and/or such assistants as may be selected by him/her, to perform the following operation/procedure(s):BILATERAL BREAST RECONSTRUCTION WITH STAGE 1 TISSUE EXPANDER PLACEMENT WITH ALLODERM AND CREATION OF AUTODERM FLAPS       Note: If unable to obtain consent prior to an emergent procedure, document the emergent reason in the medical record. This procedure has been explained to my (our) satisfaction and included in the explanation was: The intended benefit, nature, and extent of the procedure to be performed; The significant risks involved and the probability of success; Alternative procedures and methods of treatment; The dangers and probable consequences of such alternatives (including no procedure or treatment); The expected consequences of the procedure on my future health; Whether other qualified individuals would be performing important surgical tasks and/or whether  would be present to advise or support the procedure. I (we) understand that there are other risks of infection and other serious complications in the pre-operative/procedural and postoperative/procedural stages of my (our) care. I (we) have asked all of the questions which I (we) thought were important in deciding whether or not to undergo treatment or diagnosis. These questions have been answered to my (our) satisfaction. I (we) understand that no assurance can be given that the procedure will be a success, and no guarantee or warranty of success has been given to me (us).     It has been explained to me (us) that during the course of the operation/procedure, unforeseen conditions may be revealed that necessitate extension of the original procedure(s) or different procedure(s) than those set forth in Paragraph 1. I (we) authorize and request that the above-named physician, his/her assistants or his/her designees, perform procedures as necessary and desirable if deemed to be in my (our) best interest.     Revised 8/2/2021                                                                          Page 1 of 2       I acknowledge that health care personnel may be observing this procedure for the purpose of medical education or other specified purposes as may be necessary as requested and/or approved by my (our) physician. I (we) consent to the disposal by the hospital Pathologist of the removed tissue, parts or organs in accordance with hospital policy. I do ____ do not ____ consent to the use of a local infiltration pain blocking agent that will be used by my provider/surgical provider to help alleviate pain during my procedure. I do ____ do not ____ consent to an emergent blood transfusion in the case of a life-threatening situation that requires blood components to be administered. This consent is valid for 24 hours from the beginning of the procedure. This patient does ____ or does not ____ currently have a DNR status/order. If DNR order is in place, obtain Addendum to the Surgical Consent for ALL Patients with a DNR Order to address susan-operative status for limited intervention or DNR suspension.      I have read and fully understand the above Consent for Operation/Procedure and that all blanks were completed before I signed the consent.   _____________________________       _____________________      ____/____am/pm  Signature of Patient or legal representative      Printed Name / Relationship            Date / Time   ____________________________       _____________________      ____/____am/pm  Witness to Signature                                    Printed Name                    Date / Time    If patient is unable to sign or is a minor, complete the following)  Patient is a minor, ____ years of age, or unable to sign because:   ______________________________________________________________________________________________    If a phone consent is obtained, consent will be documented by using two health care professionals, each affirming that the consenting party has no questions and gives consent for the procedure discussed with the physician/provider.   _____________________          ____________________       _____/_____am/pm   2nd witness to phone consent        Printed name           Date / Time    Informed Consent:  I have provided the explanation described above in section 1 to the patient and/or legal representative.  I have provided the patient and/or legal representative with an opportunity to ask any questions about the proposed operation/procedure.   ___________________________          ____________________         ____/____am/pm  Provider / Proceduralist                            Printed name            Date / Time  Revised 8/2/2021                                                                      Page 2 of 2

## 2022-08-11 NOTE — PROGRESS NOTES
[facility] PRE-SURGICAL TESTING INSTRUCTIONS                      PRIOR TO PROCEDURE DATE:    1. PLEASE FOLLOW ANY INSTRUCTIONS GIVEN TO YOU PER YOUR SURGEON. 2. Arrange for someone to drive you home and be with you for the first 24 hours after discharge for your safety after your procedure for which you received sedation. Ensure it is someone we can share information with regarding your discharge. NOTE: At this time ONLY 2 ADULTS may accompany you & everyone must be MASKED. 3. You must contact your surgeon for instructions IF:  You are taking any blood thinners, aspirin, anti-inflammatory or vitamins. There is a change in your physical condition such as a cold, fever, rash, cuts, sores, or any other infection, especially near your surgical site. 4. Do not drink alcohol the day before or day of your procedure. Do not use any recreational marijuana at least 24 hours or street drugs (heroin, cocaine) at minimum 5 days prior to your procedure. 5. A Pre-Surgical History and Physical MUST be completed WITHIN 30 DAYS OR LESS prior to your procedure. by your Physician or an Urgent Care        THE DAY OF YOUR PROCEDURE:  1. Follow instructions for ARRIVAL TIME as DIRECTED BY YOUR SURGEON. 2. Enter the MAIN entrance from [employer] and follow the signs to the free Parking Garage or [employer] (offered free of charge 7 am-5pm). 3. Enter the Main Entrance of the hospital (do not enter from the lower level of the parking garage). Upon entrance, check in with the  at the surgical information desk on your LEFT. Bring your insurance card and photo ID to register      4. DO NOT EAT ANYTHING 8 hours prior to arrival for surgery. You may have up to 8 ounces of water 4 hours prior to your arrival for surgery.    NOTE: ALL Gastric, Bariatric & Bowel surgery patients - you MUST follow your surgeon's instructions regarding eating/ drinking as you will have very specific instructions to follow. If you did not receive these, call your surgeon's office immediately. 5. MEDICATIONS:  Take the following medications with a SMALL sip of water: OMEPRAZOLE  Use your usual dose of inhalers the morning of surgery. BRING your rescue inhaler with you to hospital.   Anesthesia does NOT want you to take insulin the morning of surgery. They will control your blood sugar while you are at the hospital. Please contact your ordering physician for instructions regarding your insulin the night before your procedure. If you have an insulin pump, please keep it set on basal rate. Bariatric patient's call your surgeon if on diabetic medications as some may need to be stopped 1 week prior to surgery    6. Do not swallow additional water when brushing teeth. No gum, candy, mints, or ice chips. Refrain from smoking or at least decrease the amount on day of surgery. 7. Morning of surgery:   Take a shower with an antibacterial soap (i.e., Safeguard or Dial) OR your physician may have instructed you to use Hibiclens. Dress in loose, comfortable clothing appropriate for redressing after your procedure. Do not wear jewelry (including body piercings), make-up (especially NO eye make-up), fingernail polish (NO toenail polish if foot/leg surgery), lotion, powders, or metal hairclips. Do not shave or wax for 72 hours prior to procedure near your operative site. Shaving with a razor can irritate your skin and make it easier to develop an infection. On the day of your procedure, any hair that needs to be removed near the surgical site will be 'clipped' by a healthcare worker using a special clipper designed to avoid skin irritation. 8. Dentures, glasses, or contacts will need to be removed before your procedure. Bring cases for your glasses, contacts, dentures, or hearing aids to protect them while you are in surgery.       9. If you use a CPAP, please bring it with you on the day of your procedure. 10. We recommend that valuable personal belongings such as cash, cell phones, e-tablets, or jewelry, be left at home during your stay. The hospital will not be responsible for valuables that are not secured in the hospital safe. However, if your insurance requires a co-pay, you may want to bring a method of payment, i.e., Check or credit card, if you wish to pay your co-pay the day of surgery. 11. If you are to stay overnight, you may bring a bag with personal items. Please have any large items you may need brought in by your family after your arrival to your hospital room. 12. If you have a Living Will or Durable Power of , please bring a copy on the day of your procedure. How we keep you safe and work to prevent surgical site infections:   1. Health care workers should always check your ID bracelet to verify your name and birth date. You will be asked many times to state your name, date of birth, and allergies. 2. Health care workers should always clean their hands with soap or alcohol gel before providing care to you. It is okay to ask anyone if they cleaned their hands before they touch you. 3. You will be actively involved in verifying the type of procedure you are having and ensuring the correct surgical site. This will be confirmed multiple times prior to your procedure. Do NOT andra your surgery site UNLESS instructed to by your surgeon. 4. When you are in the operating room, your surgical site will be cleansed with a special soap, and in most cases, you will be given an antibiotic before the surgery begins. What to expect AFTER your procedure? 1. Immediately following your procedure, your will be taken to the PACU for the first phase of your recovery. Your nurse will help you recover from any potential side effects of anesthesia, such as extreme drowsiness, changes in your vital signs or breathing patterns.  Nausea, headache, muscle aches, or sore throat may also occur after anesthesia. Your nurse will help you manage these potential side effects. 2. For comfort and safety, arrange to have someone at home with you for the first 24 hours after discharge. 3. You and your family will be given written instructions about your diet, activity, dressing care, medications, and return visits. 4. Once at home, should issues with nausea, pain, or bleeding occur, or should you notice any signs of infection, you should call your surgeon. 5. Always clean your hands before and after caring for your wound. Do not let your family touch your surgery site without cleaning their hands. 6. Narcotic pain medications can cause significant constipation. You may want to add a stool softener to your postoperative medication schedule or speak to your surgeon on how best to manage this SIDE EFFECT. SPECIAL INSTRUCTIONS     Thank you for allowing us to care for you. We strive to exceed your expectations in the delivery of care and service provided to you and your family. If you need to contact the [place] [address] staff for any reason, please call us at [phone]    Instructions reviewed with patient during preadmission testing phone interview.   Parker Iglesias RN.8/11/2022 .9:26 AM      ADDITIONAL EDUCATIONAL INFORMATION REVIEWED PER PHONE WITH YOU AND/OR YOUR FAMILY:  No Hibiclens® Bathing Instructions   Yes Antibacterial Soap

## 2022-08-11 NOTE — PROGRESS NOTES
forth in Paragraph 1. I (we) authorize and request that the above-named physician, his/her assistants or his/her designees, perform procedures as necessary and desirable if deemed to be in my (our) best interest.     Revised 8/2/2021                                                                          Page 1 of 2       I acknowledge that health care personnel may be observing this procedure for the purpose of medical education or other specified purposes as may be necessary as requested and/or approved by my (our) physician. I (we) consent to the disposal by the hospital Pathologist of the removed tissue, parts or organs in accordance with hospital policy. I do ____ do not ____ consent to the use of a local infiltration pain blocking agent that will be used by my provider/surgical provider to help alleviate pain during my procedure. I do ____ do not ____ consent to an emergent blood transfusion in the case of a life-threatening situation that requires blood components to be administered. This consent is valid for 24 hours from the beginning of the procedure. This patient does ____ or does not ____ currently have a DNR status/order. If DNR order is in place, obtain Addendum to the Surgical Consent for ALL Patients with a DNR Order to address susan-operative status for limited intervention or DNR suspension.      I have read and fully understand the above Consent for Operation/Procedure and that all blanks were completed before I signed the consent.   _____________________________       _____________________      ____/____am/pm  Signature of Patient or legal representative      Printed Name / Relationship            Date / Time   ____________________________       _____________________      ____/____am/pm  Witness to Signature                                    Printed Name                    Date / Time    If patient is unable to sign or is a minor, complete the following)  Patient is a minor, ____ years of age, or unable to sign because:   ______________________________________________________________________________________________    If a phone consent is obtained, consent will be documented by using two health care professionals, each affirming that the consenting party has no questions and gives consent for the procedure discussed with the physician/provider.   _____________________          ____________________       _____/_____am/pm   2nd witness to phone consent        Printed name           Date / Time    Informed Consent:  I have provided the explanation described above in section 1 to the patient and/or legal representative.  I have provided the patient and/or legal representative with an opportunity to ask any questions about the proposed operation/procedure.   ___________________________          ____________________         ____/____am/pm  Provider / Proceduralist                            Printed name            Date / Time  Revised 8/2/2021                                                                      Page 2 of 2

## 2022-08-11 NOTE — TELEPHONE ENCOUNTER
Patient called to see if she could move her appointment on Monday, 8/15/22 at 9:00 AM, to another day - is there any other open availability the rest of the week with Caroline Donaldson? She has a another medical appointment that conflicts.      Please call back at [phone]

## 2022-08-15 NOTE — TELEPHONE ENCOUNTER
I spoke with Lucrecia Lovelace at [facility] ([phone]) to follow up on the [facility] Predetermination Of Benefits Request Form and clinicals that I faxed in on 8-9-2022. PENDING. I will leave this phone note open.

## 2022-08-16 ENCOUNTER — TELEPHONE (OUTPATIENT)
Dept: SURGERY | Age: 47
End: 2022-08-16

## 2022-08-16 ENCOUNTER — NURSE ONLY (OUTPATIENT)
Dept: SURGERY | Age: 47
End: 2022-08-16

## 2022-08-16 DIAGNOSIS — C50.912 MALIGNANT NEOPLASM OF LEFT FEMALE BREAST, UNSPECIFIED ESTROGEN RECEPTOR STATUS, UNSPECIFIED SITE OF BREAST (HCC): Primary | ICD-10-CM

## 2022-08-17 NOTE — TELEPHONE ENCOUNTER
I faxed the [facility] paperwork to [email or web address]. I lmom for the patient on the phone number provided. I advised that the blank information is the patient or employee information and we do not complete that portion on the paperwork. I will close this phone note.

## 2022-08-17 NOTE — TELEPHONE ENCOUNTER
Patient called to let staff know that the e-mail that was sent for her 's FMLA was blank. Please re-send with attached documents. E-mail on file was verified with patient: [email or web address]    If needed, patient can be reached at [phone]

## 2022-08-22 ENCOUNTER — ANESTHESIA (OUTPATIENT)
Dept: OPERATING ROOM | Age: 47
End: 2022-08-22
Payer: COMMERCIAL

## 2022-08-22 ENCOUNTER — HOSPITAL ENCOUNTER (OUTPATIENT)
Dept: NUCLEAR MEDICINE | Age: 47
Discharge: HOME | End: 2022-08-22
Attending: SURGERY
Payer: COMMERCIAL

## 2022-08-22 ENCOUNTER — HOSPITAL ENCOUNTER (OUTPATIENT)
Age: 47
Setting detail: OUTPATIENT SURGERY
Discharge: HOME | End: 2022-08-22
Attending: SURGERY | Admitting: SURGERY
Payer: COMMERCIAL

## 2022-08-22 ENCOUNTER — ANESTHESIA EVENT (OUTPATIENT)
Dept: OPERATING ROOM | Age: 47
End: 2022-08-22
Payer: COMMERCIAL

## 2022-08-22 VITALS
RESPIRATION RATE: 15 BRPM | HEIGHT: 62 IN | DIASTOLIC BLOOD PRESSURE: 74 MMHG | TEMPERATURE: 96.3 F | WEIGHT: 175.8 LBS | OXYGEN SATURATION: 93 % | SYSTOLIC BLOOD PRESSURE: 108 MMHG | HEART RATE: 59 BPM | BODY MASS INDEX: 32.35 KG/M2

## 2022-08-22 DIAGNOSIS — Z17.0 MALIGNANT NEOPLASM OF OVERLAPPING SITES OF LEFT BREAST IN FEMALE, ESTROGEN RECEPTOR POSITIVE (HCC): ICD-10-CM

## 2022-08-22 DIAGNOSIS — Z17.0 MALIGNANT NEOPLASM OF LEFT BREAST IN FEMALE, ESTROGEN RECEPTOR POSITIVE, UNSPECIFIED SITE OF BREAST (HCC): ICD-10-CM

## 2022-08-22 DIAGNOSIS — Z17.0 MALIGNANT NEOPLASM OF LOWER-INNER QUADRANT OF LEFT BREAST IN FEMALE, ESTROGEN RECEPTOR POSITIVE (HCC): Primary | ICD-10-CM

## 2022-08-22 DIAGNOSIS — C50.912 MALIGNANT NEOPLASM OF LEFT BREAST IN FEMALE, ESTROGEN RECEPTOR POSITIVE, UNSPECIFIED SITE OF BREAST (HCC): ICD-10-CM

## 2022-08-22 DIAGNOSIS — C50.312 MALIGNANT NEOPLASM OF LOWER-INNER QUADRANT OF LEFT BREAST IN FEMALE, ESTROGEN RECEPTOR POSITIVE (HCC): Primary | ICD-10-CM

## 2022-08-22 DIAGNOSIS — C50.812 MALIGNANT NEOPLASM OF OVERLAPPING SITES OF LEFT BREAST IN FEMALE, ESTROGEN RECEPTOR POSITIVE (HCC): ICD-10-CM

## 2022-08-22 LAB
APTT: 36.7 SEC (ref 23–34.3)
GLUCOSE BLD-MCNC: 116 MG/DL (ref 70–99)
PERFORMED ON: ABNORMAL

## 2022-08-22 PROCEDURE — 85730 THROMBOPLASTIN TIME PARTIAL: CPT

## 2022-08-22 PROCEDURE — 2580000003 HC RX 258: Performed by: ANESTHESIOLOGY

## 2022-08-22 PROCEDURE — C1729 CATH, DRAINAGE: HCPCS | Performed by: SURGERY

## 2022-08-22 PROCEDURE — 38525 BIOPSY/REMOVAL LYMPH NODES: CPT | Performed by: SURGERY

## 2022-08-22 PROCEDURE — 6360000002 HC RX W HCPCS: Performed by: SURGERY

## 2022-08-22 PROCEDURE — 2709999900 HC NON-CHARGEABLE SUPPLY: Performed by: SURGERY

## 2022-08-22 PROCEDURE — A4217 STERILE WATER/SALINE, 500 ML: HCPCS | Performed by: SURGERY

## 2022-08-22 PROCEDURE — 19303 MAST SIMPLE COMPLETE: CPT | Performed by: SURGERY

## 2022-08-22 PROCEDURE — 88307 TISSUE EXAM BY PATHOLOGIST: CPT

## 2022-08-22 PROCEDURE — 3700000001 HC ADD 15 MINUTES (ANESTHESIA): Performed by: SURGERY

## 2022-08-22 PROCEDURE — 88305 TISSUE EXAM BY PATHOLOGIST: CPT

## 2022-08-22 PROCEDURE — 88342 IMHCHEM/IMCYTCHM 1ST ANTB: CPT

## 2022-08-22 PROCEDURE — 6360000002 HC RX W HCPCS: Performed by: NURSE ANESTHETIST, CERTIFIED REGISTERED

## 2022-08-22 PROCEDURE — 6360000002 HC RX W HCPCS: Performed by: ANESTHESIOLOGY

## 2022-08-22 PROCEDURE — 3600000014 HC SURGERY LEVEL 4 ADDTL 15MIN: Performed by: SURGERY

## 2022-08-22 PROCEDURE — 7100000001 HC PACU RECOVERY - ADDTL 15 MIN: Performed by: SURGERY

## 2022-08-22 PROCEDURE — 3600000004 HC SURGERY LEVEL 4 BASE: Performed by: SURGERY

## 2022-08-22 PROCEDURE — C1889 IMPLANT/INSERT DEVICE, NOC: HCPCS | Performed by: SURGERY

## 2022-08-22 PROCEDURE — 15777 ACELLULAR DERM MATRIX IMPLT: CPT | Performed by: SURGERY

## 2022-08-22 PROCEDURE — 7100000000 HC PACU RECOVERY - FIRST 15 MIN: Performed by: SURGERY

## 2022-08-22 PROCEDURE — 19357 TISS XPNDR PLMT BRST RCNSTJ: CPT | Performed by: SURGERY

## 2022-08-22 PROCEDURE — C1789 PROSTHESIS, BREAST, IMP: HCPCS | Performed by: SURGERY

## 2022-08-22 PROCEDURE — 3700000000 HC ANESTHESIA ATTENDED CARE: Performed by: SURGERY

## 2022-08-22 PROCEDURE — 6370000000 HC RX 637 (ALT 250 FOR IP): Performed by: SURGERY

## 2022-08-22 PROCEDURE — A9541 TC99M SULFUR COLLOID: HCPCS | Performed by: SURGERY

## 2022-08-22 PROCEDURE — 2580000003 HC RX 258: Performed by: SURGERY

## 2022-08-22 PROCEDURE — 3430000000 HC RX DIAGNOSTIC RADIOPHARMACEUTICAL: Performed by: SURGERY

## 2022-08-22 PROCEDURE — 2500000003 HC RX 250 WO HCPCS: Performed by: SURGERY

## 2022-08-22 PROCEDURE — 2500000003 HC RX 250 WO HCPCS: Performed by: NURSE ANESTHETIST, CERTIFIED REGISTERED

## 2022-08-22 PROCEDURE — 2720000010 HC SURG SUPPLY STERILE: Performed by: SURGERY

## 2022-08-22 PROCEDURE — 7100000011 HC PHASE II RECOVERY - ADDTL 15 MIN: Performed by: SURGERY

## 2022-08-22 PROCEDURE — 7100000010 HC PHASE II RECOVERY - FIRST 15 MIN: Performed by: SURGERY

## 2022-08-22 PROCEDURE — 78195 LYMPH SYSTEM IMAGING: CPT

## 2022-08-22 PROCEDURE — C9290 INJ, BUPIVACAINE LIPOSOME: HCPCS | Performed by: SURGERY

## 2022-08-22 DEVICE — GRAFT HUM TISS THK2-2.8MM THCK L PERF CNTOUR ACELLULAR DERM: Type: IMPLANTABLE DEVICE | Site: BREAST | Status: FUNCTIONAL

## 2022-08-22 RX ORDER — OXYCODONE HYDROCHLORIDE 5 MG/1
5 TABLET ORAL EVERY 6 HOURS PRN
Qty: 20 TABLET | Refills: 0 | Status: SHIPPED | OUTPATIENT
Start: 2022-08-22 | End: 2022-08-24

## 2022-08-22 RX ORDER — MIDAZOLAM HYDROCHLORIDE 1 MG/ML
INJECTION INTRAMUSCULAR; INTRAVENOUS PRN
Status: DISCONTINUED | OUTPATIENT
Start: 2022-08-22 | End: 2022-08-22 | Stop reason: SDUPTHER

## 2022-08-22 RX ORDER — CEPHALEXIN 500 MG/1
500 CAPSULE ORAL 4 TIMES DAILY
Qty: 40 CAPSULE | Refills: 0 | Status: SHIPPED | OUTPATIENT
Start: 2022-08-22 | End: 2022-09-01

## 2022-08-22 RX ORDER — SODIUM CHLORIDE 9 MG/ML
INJECTION, SOLUTION INTRAVENOUS PRN
Status: DISCONTINUED | OUTPATIENT
Start: 2022-08-22 | End: 2022-08-22 | Stop reason: HOSPADM

## 2022-08-22 RX ORDER — LIDOCAINE HYDROCHLORIDE 10 MG/ML
1 INJECTION, SOLUTION EPIDURAL; INFILTRATION; INTRACAUDAL; PERINEURAL
Status: DISCONTINUED | OUTPATIENT
Start: 2022-08-22 | End: 2022-08-22 | Stop reason: HOSPADM

## 2022-08-22 RX ORDER — SODIUM CHLORIDE 0.9 % (FLUSH) 0.9 %
5-40 SYRINGE (ML) INJECTION EVERY 12 HOURS SCHEDULED
Status: DISCONTINUED | OUTPATIENT
Start: 2022-08-22 | End: 2022-08-22 | Stop reason: HOSPADM

## 2022-08-22 RX ORDER — ONDANSETRON 2 MG/ML
INJECTION INTRAMUSCULAR; INTRAVENOUS PRN
Status: DISCONTINUED | OUTPATIENT
Start: 2022-08-22 | End: 2022-08-22 | Stop reason: SDUPTHER

## 2022-08-22 RX ORDER — OXYCODONE HYDROCHLORIDE 5 MG/1
5 TABLET ORAL
Status: DISCONTINUED | OUTPATIENT
Start: 2022-08-22 | End: 2022-08-22 | Stop reason: HOSPADM

## 2022-08-22 RX ORDER — ESCITALOPRAM OXALATE 20 MG/1
1 TABLET ORAL DAILY
COMMUNITY
Start: 2022-08-15

## 2022-08-22 RX ORDER — MAGNESIUM HYDROXIDE 1200 MG/15ML
LIQUID ORAL CONTINUOUS PRN
Status: DISCONTINUED | OUTPATIENT
Start: 2022-08-22 | End: 2022-08-22 | Stop reason: HOSPADM

## 2022-08-22 RX ORDER — CLONIDINE HYDROCHLORIDE 0.1 MG/1
0.1 TABLET ORAL NIGHTLY
COMMUNITY
Start: 2022-08-01

## 2022-08-22 RX ORDER — SODIUM CHLORIDE 9 MG/ML
25 INJECTION, SOLUTION INTRAVENOUS PRN
Status: DISCONTINUED | OUTPATIENT
Start: 2022-08-22 | End: 2022-08-22 | Stop reason: HOSPADM

## 2022-08-22 RX ORDER — FENTANYL CITRATE 50 UG/ML
INJECTION, SOLUTION INTRAMUSCULAR; INTRAVENOUS PRN
Status: DISCONTINUED | OUTPATIENT
Start: 2022-08-22 | End: 2022-08-22 | Stop reason: SDUPTHER

## 2022-08-22 RX ORDER — MEPERIDINE HYDROCHLORIDE 25 MG/ML
12.5 INJECTION INTRAMUSCULAR; INTRAVENOUS; SUBCUTANEOUS EVERY 5 MIN PRN
Status: DISCONTINUED | OUTPATIENT
Start: 2022-08-22 | End: 2022-08-22 | Stop reason: HOSPADM

## 2022-08-22 RX ORDER — ACETAMINOPHEN 325 MG/1
650 TABLET ORAL
Status: COMPLETED | OUTPATIENT
Start: 2022-08-22 | End: 2022-08-22

## 2022-08-22 RX ORDER — SODIUM CHLORIDE 0.9 % (FLUSH) 0.9 %
5-40 SYRINGE (ML) INJECTION PRN
Status: DISCONTINUED | OUTPATIENT
Start: 2022-08-22 | End: 2022-08-22 | Stop reason: HOSPADM

## 2022-08-22 RX ORDER — GLYCOPYRROLATE 0.2 MG/ML
INJECTION INTRAMUSCULAR; INTRAVENOUS PRN
Status: DISCONTINUED | OUTPATIENT
Start: 2022-08-22 | End: 2022-08-22 | Stop reason: SDUPTHER

## 2022-08-22 RX ORDER — HYDRALAZINE HYDROCHLORIDE 20 MG/ML
10 INJECTION INTRAMUSCULAR; INTRAVENOUS
Status: DISCONTINUED | OUTPATIENT
Start: 2022-08-22 | End: 2022-08-22 | Stop reason: HOSPADM

## 2022-08-22 RX ORDER — PROCHLORPERAZINE EDISYLATE 5 MG/ML
5 INJECTION INTRAMUSCULAR; INTRAVENOUS
Status: COMPLETED | OUTPATIENT
Start: 2022-08-22 | End: 2022-08-22

## 2022-08-22 RX ORDER — DOCUSATE SODIUM 100 MG/1
100 CAPSULE, LIQUID FILLED ORAL 2 TIMES DAILY
Qty: 30 CAPSULE | Refills: 0 | Status: SHIPPED | OUTPATIENT
Start: 2022-08-22 | End: 2022-09-05

## 2022-08-22 RX ORDER — APREPITANT 40 MG/1
40 CAPSULE ORAL ONCE
Status: COMPLETED | OUTPATIENT
Start: 2022-08-22 | End: 2022-08-22

## 2022-08-22 RX ORDER — PROPOFOL 10 MG/ML
INJECTION, EMULSION INTRAVENOUS PRN
Status: DISCONTINUED | OUTPATIENT
Start: 2022-08-22 | End: 2022-08-22 | Stop reason: SDUPTHER

## 2022-08-22 RX ORDER — SODIUM CHLORIDE, SODIUM LACTATE, POTASSIUM CHLORIDE, CALCIUM CHLORIDE 600; 310; 30; 20 MG/100ML; MG/100ML; MG/100ML; MG/100ML
INJECTION, SOLUTION INTRAVENOUS CONTINUOUS
Status: DISCONTINUED | OUTPATIENT
Start: 2022-08-22 | End: 2022-08-22 | Stop reason: HOSPADM

## 2022-08-22 RX ORDER — ROCURONIUM BROMIDE 10 MG/ML
INJECTION, SOLUTION INTRAVENOUS PRN
Status: DISCONTINUED | OUTPATIENT
Start: 2022-08-22 | End: 2022-08-22 | Stop reason: SDUPTHER

## 2022-08-22 RX ADMIN — ROCURONIUM BROMIDE 50 MG: 10 INJECTION, SOLUTION INTRAVENOUS at 07:46

## 2022-08-22 RX ADMIN — SODIUM CHLORIDE 1000 MG: 9 INJECTION, SOLUTION INTRAVENOUS at 07:58

## 2022-08-22 RX ADMIN — SODIUM CHLORIDE, POTASSIUM CHLORIDE, SODIUM LACTATE AND CALCIUM CHLORIDE: 600; 310; 30; 20 INJECTION, SOLUTION INTRAVENOUS at 07:04

## 2022-08-22 RX ADMIN — ACETAMINOPHEN 650 MG: 325 TABLET ORAL at 06:41

## 2022-08-22 RX ADMIN — Medication 800 MICRO CURIE: at 07:17

## 2022-08-22 RX ADMIN — FENTANYL CITRATE 25 MCG: 50 INJECTION, SOLUTION INTRAMUSCULAR; INTRAVENOUS at 07:53

## 2022-08-22 RX ADMIN — APREPITANT 40 MG: 40 CAPSULE ORAL at 07:35

## 2022-08-22 RX ADMIN — PROCHLORPERAZINE EDISYLATE 5 MG: 5 INJECTION INTRAMUSCULAR; INTRAVENOUS at 10:58

## 2022-08-22 RX ADMIN — FENTANYL CITRATE 50 MCG: 50 INJECTION, SOLUTION INTRAMUSCULAR; INTRAVENOUS at 09:29

## 2022-08-22 RX ADMIN — FENTANYL CITRATE 50 MCG: 50 INJECTION, SOLUTION INTRAMUSCULAR; INTRAVENOUS at 08:36

## 2022-08-22 RX ADMIN — CEFAZOLIN 2000 MG: 2 INJECTION, POWDER, FOR SOLUTION INTRAMUSCULAR; INTRAVENOUS at 07:45

## 2022-08-22 RX ADMIN — GLYCOPYRROLATE 0.2 MG: 0.2 INJECTION, SOLUTION INTRAMUSCULAR; INTRAVENOUS at 07:43

## 2022-08-22 RX ADMIN — Medication 10 MG: at 07:53

## 2022-08-22 RX ADMIN — PROPOFOL 20 MG: 10 INJECTION, EMULSION INTRAVENOUS at 07:53

## 2022-08-22 RX ADMIN — SODIUM CHLORIDE, POTASSIUM CHLORIDE, SODIUM LACTATE AND CALCIUM CHLORIDE: 600; 310; 30; 20 INJECTION, SOLUTION INTRAVENOUS at 09:29

## 2022-08-22 RX ADMIN — HYDROMORPHONE HYDROCHLORIDE 0.5 MG: 1 INJECTION, SOLUTION INTRAMUSCULAR; INTRAVENOUS; SUBCUTANEOUS at 11:42

## 2022-08-22 RX ADMIN — MIDAZOLAM HYDROCHLORIDE 2 MG: 2 INJECTION, SOLUTION INTRAMUSCULAR; INTRAVENOUS at 07:37

## 2022-08-22 RX ADMIN — Medication 70 MG: at 07:46

## 2022-08-22 RX ADMIN — PROPOFOL 140 MG: 10 INJECTION, EMULSION INTRAVENOUS at 07:46

## 2022-08-22 RX ADMIN — FENTANYL CITRATE 75 MCG: 50 INJECTION, SOLUTION INTRAMUSCULAR; INTRAVENOUS at 08:08

## 2022-08-22 RX ADMIN — ONDANSETRON 4 MG: 2 INJECTION INTRAMUSCULAR; INTRAVENOUS at 08:10

## 2022-08-22 RX ADMIN — HYDROMORPHONE HYDROCHLORIDE 0.5 MG: 1 INJECTION, SOLUTION INTRAMUSCULAR; INTRAVENOUS; SUBCUTANEOUS at 11:02

## 2022-08-22 ASSESSMENT — PAIN SCALES - GENERAL
PAINLEVEL_OUTOF10: 3
PAINLEVEL_OUTOF10: 7
PAINLEVEL_OUTOF10: 8
PAINLEVEL_OUTOF10: 4
PAINLEVEL_OUTOF10: 5
PAINLEVEL_OUTOF10: 6

## 2022-08-22 ASSESSMENT — PAIN DESCRIPTION - LOCATION
LOCATION: BREAST
LOCATION: BREAST
LOCATION: HEAD
LOCATION: BREAST
LOCATION: BREAST

## 2022-08-22 ASSESSMENT — PAIN DESCRIPTION - ORIENTATION
ORIENTATION: LEFT;RIGHT
ORIENTATION: ANTERIOR
ORIENTATION: RIGHT;LEFT

## 2022-08-22 ASSESSMENT — PAIN DESCRIPTION - PAIN TYPE
TYPE: SURGICAL PAIN
TYPE: SURGICAL PAIN

## 2022-08-22 ASSESSMENT — PAIN DESCRIPTION - DESCRIPTORS
DESCRIPTORS: SHARP
DESCRIPTORS: SHARP
DESCRIPTORS: PATIENT UNABLE TO DESCRIBE
DESCRIPTORS: DISCOMFORT
DESCRIPTORS: ACHING

## 2022-08-22 ASSESSMENT — PAIN - FUNCTIONAL ASSESSMENT: PAIN_FUNCTIONAL_ASSESSMENT: PREVENTS OR INTERFERES SOME ACTIVE ACTIVITIES AND ADLS

## 2022-08-22 ASSESSMENT — PAIN DESCRIPTION - FREQUENCY
FREQUENCY: CONTINUOUS

## 2022-08-22 NOTE — H&P
Update History & Physical    The patient's History and Physical of August 15, 2022 was reviewed with the patient and I examined the patient. There was no change. The surgical site was confirmed by the patient and me. Plan: The risks, benefits, expected outcome, and alternative to the recommended procedure have been discussed with the patient. Patient understands and wants to proceed with the procedure.      Electronically signed by Jackie Beltran MD on 8/22/2022 at 7:36 AM]

## 2022-08-22 NOTE — DISCHARGE INSTRUCTIONS
Flexoril® . - Additionally do NOT take any of the following products:    Aspirin/low-dose aspirin    Ibuprofen (Advil®, Motrin®    Naprosyn or Naproxen (Aleve®)    Vitamin E (even small amounts in multiple vitamins)    Herbals or homeopathic medicines or green tea    Growth hormone    Diet pills (Meridia®, Metabolife®, etc)         - You will also be prescribed an antibiotic, make sure to complete the prescribed                    course. You can also take a probiotic yogurt (Activia®) to help with your bowel                 function while on these medications. - Take your medications as prescribed. Antibiotic to prevent infection: Keflex    Pain medication, if needed: Oxycodone    Other: Lactobacillus (Culturelle) probiotic while taking antibiotics. You can    obtain this over the counter or within any yogurt that specifically has the active    ingredient: L. acidophilus (Lactobacillus acidophilus). Wound Care  - You will have some swelling or bruising of the breasts and upper abdomen. This is normal and will lessen over the next 1 to 3 weeks. - You may notice a change in sensation or numbness of the breast skin. This is common after surgery and should improve gradually over time. - You can resume daily showers in 24 hours, but avoid very hot water. You can allow soapy water to run over your breast and then after showering, pat the breast dry. Do not scrub or wipe the reconstructed breast until instructed (usually 6-8 weeks). - Do NOT submerge your incisions (e.g. no swimming or baths submerging breasts until instructed)  - Any drains may be attached to a belt or cloth strap while showering to prevent pulling.   - Drains are typically removed within 7 to 10 days after surgery, but may remain in place for longer. They are removed when drainage is less than 30 cc over a 24-hour period for 2 consecutive days. Careful recording is important.   Be sure to bring your recordings to your follow-up appointment(s). - You may have one of the two types of drain dressings:   1. Occlusive dressing: This dressing does not need to be changed unless the                white disc under the plastic (Tegaderm) covering becomes wet. If the white disc              does become wet, please wash hands before starting and have a clean work  surface. Remove the top covering and the disc then redress using the traditional              dressing. 2. Traditional gauze dressing: This dressing is to be changed once each day. Please wash hands before starting and have clean work surface area to open                  the dressing material on. Open the surgi-bra and remove the old dressing. Open  the packages of dressings and select the gauze with the slit; place the slit                        around the tube (where it comes out of your body). Take the other gauze and                   place it on top. Close the surgi-bra, this will hold the dressing in place. DO NOT               USE TAPE. - In the event of accidental drain removal, place a piece of dry gauze over the  drain site and contact your surgeon. - If your drain bulb loses suction or your drain has migrated out from the drain site,                do not attempt to push the drain back into your skin. Cover the area with dry                    gauze. You may be asked by your surgeon to place a piece of semi occlusive                  dressing (Tegaderm) over the drain site to keep the site clean and drain in place             until you are seen in the office.  - Use a gauze pad to protect your clothing from any oozing at the incision sites. - The incisions on your breasts may be red for at least 3 to 6 months. This is a normal healing ridge. The color will then begin to fade. It takes 1 year for scar maturation, but oftentimes revisions & planned staged surgeries occur prior to this.   - A bra should be worn at all times day and night after surgery. The bra should not be tight, have under wires, or have strong elastic. You will receive a surgical bra from the hospital stay as well as an additional clean bra to take home. Wear these bras for the first week until your follow-up appointment. - You can expect to start expander infusions within 2 to 3 weeks. These are scheduled at 1 to 3 week intervals until the desired volume is obtained. This volume will be maintained until time for the next stage in your reconstructive journey or as dictated by your oncologic treatments. Follow-up  Call your doctor's office at the first sign of:  Excessive worsening pain associated with pressure of the breast.   Enlargement of the breast area (eggplant color or worsening bruising). Bleeding at the incision. Redness, drainage or odor from the incisions. Fever or chills. Shortness of breath. Do not hesitate to call if you have any questions or concerns      8/22/2022      [facility]    There are potential side effects of anesthesia or sedation you may experience for the first 24 hours. These side effects include:    Confusion or Memory loss, Dizziness, or Delayed Reaction Times   [x]A responsible person should be with you for the next 24 hours. Do not operate any vehicles (automobiles, bicycles, motorcycles) or power tools or machinery for 24 hours. Do not sign any legal documents or make any legal decisions for 24 hours. Do not drink alcohol for 24 hours or while taking narcotic pain medication. Nausea/Diet  [x] Nausea is not uncommon after having general anesthesia. Start with light diet and progress to your normal diet as you feel like eating. However, if you experience nausea or repeated episodes of vomiting which persist beyond 12-24 hours, notify your physician. Once nausea has passed, remember to keep drinking fluids.     Difficulty Passing Urine  [x]Drink extra amounts of fluid today. Notify your physician if you have not urinated within 8 hours after your procedure or you feel uncomfortable. Irritated Throat from a Breathing Tube  [x]Drink extra amounts of fluid today. Lozenges may help. Muscle Aches  [x]You may experience some generalized body aches as your muscles recover from medications used to relax them during surgery. These will gradually subside. ACTIVITY INSTRUCTIONS:  [x]Rest today. Increase activity as tolerated. [x]No heavy lifting or strenuous activity  [x]No driving until cleared by your surgeon  [x]No driving while on narcotic pain medications or for 24 hours. Otherwise, you can drive when you can sit comfortably behind the steering wheel and can slam on the brake without it hurting or turn the wheel sharply without it hurting. Practice this while sitting in the car with it parked in your driveway before trying to drive. POST OPERATION WOUND CARE INSTRUCTIONS:       Follow instructions as instructed verbally and written by your Surgeon. Call Doctor for any questions or concerns at their office number. Someone will answer the phone 24hrs/7 days a week. MEDICATION INSTRUCTIONS:  Prescription(S) x  3  were sent with you to be filled. Use as directed. Refer to your pharmacy's medication information sheets or your pharmacist for any questions you may have about the medicines prescribed. When taking pain medications, you may experience the side effect of dizziness or drowsiness. Do not drink alcohol or drive when taking these medications. If no prescriptions were sent home with you, you may take over the counter medications as needed for your discomfort unless your doctor directs differently    [x] You may resume all medications you were taking before surgery  [x] Give the list of your medications to your primary care physician on your next visit. Keep your med list updated and carry it with in case of emergencies.   [x] If you take any blood thinning medication, such as Coumadin or Plavix, check with your surgeon on when to re-start taking it. [x] Narcotic pain medications can cause significant constipation. You may want to add a stool softener to your postoperative medication schedule or speak to your surgeon on how best to manage this side effect. NARCOTIC SAFETY:  Your pain medicine is only for you to take. Safely store your medicines. Store pills up high and out of reach of children and pets. Ensure safety caps are snapped tightly  Keep track of how many pills you have left    Unused medication can be disposed of by taking them to a drop-off box or take-back program that is authorized by the [facility]. Access to a site near you can be found on the [facility] [employer] [facility] website ([email or web address] [place] [facility]). If you have a CPAP machine, it is very important that you use it daily during all periods of sleep and daytime rest during your recovery at home. Surgery and Anesthesia place a significant amount of stress on your body. Using your CPAP will help keep you safe and lessen the negative effects of that stress. If you smoke STOP. Smoking can interfere with healing and your respiratory health after surgery. We care about your health! Watch for these significant complications. Call physician if they or any other problems occur:  Fever over 101 F°    Redness, swelling, hardness or warmth at the operative site  Unrelieved nausea    Foul smelling or cloudy drainage at the operative site   Unrelieved pain after taking medications as prescribed by your doctor    Blood soaked dressing. (Some oozing may be normal)  Numb, pale, blue, cold or tingling extremity  You have prolonged anesthesia/sedation side effects          FAQs  (frequently asked questions)  About Surgical Site Infections    What is a Surgical Site Infection (SSI)?    A surgical site infection is an infection that occurs after surgery in the part of the body where the surgery took place. Most patients who have surgery do not develop an infection. However, infections develop in about 1 to 3 out of every 100 patients who have surgery. Some common symptoms of a surgical site infection are:   Redness and pain around the area where you had surgery  Drainage of cloudy fluid from your surgical wound   Fever    Can SSIs be treated? Yes. Most surgical site infections can be treated with antibiotics. The antibiotic given to you depends on the bacteria (germs) causing the infection. Sometimes patients with SSIs also need another surgery to treat the infection. What are some of the things that hospitals are doing to prevent SSIs? To prevent SSIs, doctors, nurses and other healthcare providers:   Clean their hands and arms up to their elbows with an antiseptic agent just before the surgery. Clean their hands with soap and water or an alcohol-based hand rub before and after caring for each patient. May remove some of your hair immediately before your surgery using electric clippers if the hair is in the same area where the procedure will occur. They should not shave you with a razor. Wear special hair covers, masks, gowns, and gloves during surgery to keep the surgery area clean. Give you antibiotics before your surgery starts. In most cases, you should get antibiotics within 60 minutes before the surgery starts and the antibiotics should be stopped within 24 hours after surgery. Clean the skin at the site of your surgery with a special soap that kills germs. What can I do to help prevent SSIs? Before you surgery:  Tell your doctor about other medical problems you may have. Health problems such as allergies, diabetes, and obesity could affect your surgery and your treatment. Quit smoking. Patients who smoke get more infections. Talk to your doctor about how you can quit before your surgery. Do not shave near where you will have surgery.   Shaving with a razor can irritate your skin and make it easier to develop an infection. At the time of your surgery:  Speak up if someone tries to shave you with a razor before surgery. Ask why you need to be shaved and talk with your surgeon if you have any concerns. Ask if you will get antibiotics before surgery. After your surgery:  Make sure that your healthcare providers clean their hands before examining you, either with soap and water or an alcohol-based hand rub. IF YOU DO NOT SEE YOUR PROVIDERS CLEAN THEIR HANDS, PLEASE ASK THEM TO DO SO. Family and friends who visit you should not touch the surgical wound or dressings. Family and friends should clean their hands with soap and water or an alcohol-based hand rub before and after visiting you. If you do not see them clean their hands, ask them to clean their hands. What do I need to do when I go home from the hospital?  Before you go home, your doctor nurses should explain everything you need to know about taking care of your wound. Make sure you understand how to care for your wound before you leave the hospital.    Always clean your hands before and after caring for your wound. Before you go home, make sure you know who to contact if you have questions or problems after you get home. If you have any symptoms of an infection, such as redness and pain at the surgery site, drainage, or fever, call your doctor immediately. If you have additional questions, please ask your doctor or nurse.

## 2022-08-22 NOTE — ANESTHESIA PRE PROCEDURE
Department of Anesthesiology  Preprocedure Note       Name:  Nelson Velasquez   Age:  50 y.o.  :  1975                                          MRN:  [id number]         Date:  2022      Surgeon: Lonna Frankel): MD Jose Armando Juarez MD    Procedure: Procedure(s):  BILATERAL BREAST MASTECTOMY/ LEFT AXILLARY SENTINEL NODE BIOPSY WITH RADIOISOTOPE  BILATERAL BREAST RECONSTRUCTION WITH STAGE 1 TISSUE EXPANDER PLACEMENT WITH ALLODERM AND CREATION OF AUTODERM FLAPS    Medications prior to admission:   Prior to Admission medications    Medication Sig Start Date End Date Taking? Authorizing Provider   mupirocin (BACTROBAN NASAL) 2 % nasal ointment Take by Nasal route 2 times daily for 5 days prior to surgery.  22   Dimitry Cope APRN - CNP   omeprazole (PRILOSEC) 40 MG delayed release capsule TAKE 1 CAPSULE BY MOUTH EVERY DAY 22   Historical Provider, MD   buPROPion HCl (WELLBUTRIN PO) Take 150 Units by mouth at bedtime    Historical Provider, MD   Multiple Vitamins-Minerals (ONE-A-DAY WOMENS 50+ PO) Take by mouth daily    Historical Provider, MD   calcium carbonate (TUMS) 500 MG chewable tablet Take 15 tablets by mouth as needed    Historical Provider, MD       Current medications:    Current Facility-Administered Medications   Medication Dose Route Frequency Provider Last Rate Last Admin    tranexamic acid (CYKLOKAPRON) 1,000 mg in sodium chloride 0.9 % 60 mL IVPB  1,000 mg IntraVENous On Call to OR Jose Armando Knox MD        ceFAZolin (ANCEF) 2,000 mg in sodium chloride 0.9 % 50 mL IVPB (mini-bag)  2,000 mg IntraVENous Once Aurelia Cortes MD        lidocaine PF 1 % injection 1 mL  1 mL IntraDERmal Once PRN Payton Bryant MD        lactated ringers infusion   IntraVENous Continuous Payton Bryant MD        sodium chloride flush 0.9 % injection 5-40 mL  5-40 mL IntraVENous 2 times per day Payton Bryant MD        sodium chloride flush 0.9 % injection 5-40 mL  5-40 mL IntraVENous PRN Nathaniel Howell MD        0.9 % sodium chloride infusion   IntraVENous PRN Nathaniel Howell MD           Allergies:  No Known Allergies    Problem List:    Patient Active Problem List   Diagnosis Code    Chronic GERD K21.9    Depression F32. A    Obesity, Class II, BMI 35-39.9, with comorbidity [id number]       Past Medical History:        Diagnosis Date    Cancer ([facility] [address]     BREAST    Reflux        Past Surgical History:        Procedure Laterality Date    ABDOMINAL EXPLORATION SURGERY      BREAST BIOPSY          BREAST REDUCTION SURGERY       SECTION      CHOLECYSTECTOMY      GASTRIC RESTRICTION SURGERY      GASTRIC SLEEVE    HYSTERECTOMY (CERVIX STATUS UNKNOWN)      PATEL STEREO BREAST BX ADD LESION LEFT Left 2022    PATEL STEREO BREAST BX ADD LESION LEFT 2022 [facility] [address]    PATEL STEROTACTIC LOC BREAST BIOPSY LEFT Left 2022    PATEL STEROTACTIC LOC BREAST BIOPSY LEFT 2022 [facility] [address]    OVARY REMOVAL      UPPER GASTROINTESTINAL ENDOSCOPY  07/15/2013    WITH BIOPSY AND DILITATION    UPPER GASTROINTESTINAL ENDOSCOPY N/A 2019    EGD BIOPSY performed by Chandrakant Frazier MD at [address] [place] [address]         Social History:    Social History     Tobacco Use    Smoking status: Never    Smokeless tobacco: Never   Substance Use Topics    Alcohol use: Yes     Comment: SOCIALLY 1-2 MO                                Counseling given: Not Answered      Vital Signs (Current):   Vitals:    22 0928   Weight: 165 lb (74.8 kg)   Height: 5' 1.5\" (1.562 m)                                              BP Readings from Last 3 Encounters:   22 110/73   22 (!) 132/92   22 (!) 146/47       NPO Status:                                                                                 BMI:   Wt Readings from Last 3 Encounters:   22 165 lb (74.8 kg)   22 174 lb (78.9 kg)   22 174 lb (78.9 kg) Body mass index is 30.67 kg/m². CBC:   Lab Results   Component Value Date/Time    WBC 8.5 05/06/2019 02:41 PM    RBC 4.67 05/06/2019 02:41 PM    HGB 13.6 05/06/2019 02:41 PM    HCT 41.1 05/06/2019 02:41 PM    MCV 88.0 05/06/2019 02:41 PM    RDW 13.5 05/06/2019 02:41 PM     05/06/2019 02:41 PM       CMP: No results found for: NA, K, CL, CO2, BUN, CREATININE, GFRAA, AGRATIO, LABGLOM, GLUCOSE, GLU, PROT, CALCIUM, BILITOT, ALKPHOS, AST, ALT    POC Tests: No results for input(s): POCGLU, POCNA, POCK, POCCL, POCBUN, POCHEMO, POCHCT in the last 72 hours. Coags: No results found for: PROTIME, INR, APTT    HCG (If Applicable): No results found for: PREGTESTUR, PREGSERUM, HCG, HCGQUANT     ABGs: No results found for: PHART, PO2ART, LBK7HWD, AIW4WLW, BEART, D0GXRLNH     Type & Screen (If Applicable):  No results found for: LABABO, LABRH    Drug/Infectious Status (If Applicable):  No results found for: HIV, HEPCAB    COVID-19 Screening (If Applicable): No results found for: COVID19        Anesthesia Evaluation  Patient summary reviewed and Nursing notes reviewed no history of anesthetic complications:   Airway: Mallampati: II  TM distance: >3 FB   Neck ROM: full  Mouth opening: > = 3 FB   Dental: normal exam         Pulmonary:Negative Pulmonary ROS and normal exam                               Cardiovascular:  Exercise tolerance: good (>4 METS),       (-)  angina, orthopnea and PND    ECG reviewed  Rhythm: regular  Rate: normal           Beta Blocker:  Not on Beta Blocker         Neuro/Psych:   Negative Neuro/Psych ROS              GI/Hepatic/Renal:   (+) GERD: poorly controlled,           Endo/Other: Negative Endo/Other ROS                    Abdominal:         (-) obese Abdomen: soft. Vascular: negative vascular ROS. Other Findings:           Anesthesia Plan      general     ASA 3     (Barrets esophagus worse lately.)  Induction: intravenous and rapid sequence.     MIPS: Postoperative opioids intended and Prophylactic antiemetics administered. Anesthetic plan and risks discussed with patient. Plan discussed with CRNA and attending.                     Luis Turner DO   8/22/2022

## 2022-08-22 NOTE — PROGRESS NOTES
PACU Transfer to [facility]    Procedure(s):  BILATERAL BREAST MASTECTOMY/ LEFT AXILLARY SENTINEL NODE BIOPSY WITH RADIOISOTOPE  BILATERAL BREAST RECONSTRUCTION WITH STAGE 1 TISSUE EXPANDER PLACEMENT WITH ALLODERM AND CREATION OF AUTODERM FLAPS    Pt's Current Allergies: Patient has no known allergies. Pt meets criteria to transfer to next phase of care per [facility] and [facility] standards    Recent Labs     08/22/22  0710   POCGLU 116*       Vitals:    08/22/22 1245   BP: 118/84   Pulse: 64   Resp: 10   Temp: 97.9 °F (36.6 °C)   SpO2: 93%    Oxygen saturations on Room Air      Intake/Output Summary (Last 24 hours) at 8/22/2022 1319  Last data filed at 8/22/2022 1245  Gross per 24 hour   Intake 2000 ml   Output 700 ml   Net 1300 ml       Pain assessment:  present - adequately treated  Pain Level: 5    Patient was assessed for unknown alterations to skin integrity. There were not unknown alterations observed. MYRA drain kit sent along with extra surgical bra    Patient transferred to care of [facility] RN.    Family updated and directed to Jabier Anna    8/22/2022 12:59 PM

## 2022-08-22 NOTE — PROGRESS NOTES
Procedure(s):  BILATERAL BREAST MASTECTOMY/ LEFT AXILLARY SENTINEL NODE BIOPSY WITH RADIOISOTOPE  BILATERAL BREAST RECONSTRUCTION WITH STAGE 1 TISSUE EXPANDER PLACEMENT WITH ALLODERM AND CREATION OF AUTODERM FLAPS    Current Allergies: Patient has no known allergies. Recent Labs     08/22/22  0710   POCGLU 116*       Admitted to PACU bed 4 from OR. Arrived on a stretcher . Attached to PACU monitoring system. Alarms and parameters set. Report received from anesthesia personnel. OR staff did not report skin issues that were observed while in OR  Pt arrived with oxygen per nasal cannula with oxygen at 3 liters. Pt with bilateral Honora [place]. Both compressed. Both with scant amount of serosanguinous fluid in container. Surgical bra in place  Athrombic wraps in place.

## 2022-08-22 NOTE — DISCHARGE INSTR - OTHER ORDERS
Tricia Bohr Discharge Instructions    When to empty the Tricia Bohr drain:  For the first 24 hours (one day) after most types of surgery, there will often be drainage coming out of the wound. Check the drain at least every four hours. Empty it if it is half full of fluid. Do not let the drain fill up any more than half full. After one day, empty your MYRA drain when it fills up half way, or every 8 to 12 hours even if it is not half full. After the drain is emptied, the bulb needs to be squeezed. This is done to keep the suction of the MYRA drain strong enough to pull out more fluid. How to empty the Tricia Bohr drain:  The following are general directions for emptying the MYRA drain bulb, and squeezing the bulb. Wash your hands with soap and water. Place a waterproof pad or towel under the MYRA drain to soak up any spills. Place the bulb lower than the wound to prevent fluid from going back into your body. Check the bulb for any holes or cracks. Remove the plug at one side of the bulb and pour the fluid into a measuring container. Do not touch the tip of the spout with the mouth of the collection cup or anything else. This keeps germs from getting inside the bulb and tubing. Squeeze the bulb tightly while the plug is still off. Do not squeeze the bulb if the plug is in place. While the bulb is being squeezed, put the plug back to seal the bulb. You may also place the bulb on a hard surface, such as a table. Use your elbow or hand to press down hard on the bulb, and then stick the plug in it. Measure the amount of fluid that came out of the MYRA drain bulb. Write down the amount, color, and odor of the fluid, and the date and time that you collected it. Flush the fluid down the toilet. Secure the MYRA with a pin on the tab, being careful not to puncture a hole into the bulb. You should secure the MYRA to your clothing with a pin to avoid accidentally pulling out the MYRA. Wash your hands after you are finished. Preventing problems with the MYRA drain:  Always keep the bulb lower than the wound. This will stop the fluid from going back into your body. Do not pull on the tubing. This can loosen the stitches holding the drain to your skin, causing the drain to fall out. Remember to record the date, time, amount and color of fluid collected. CONTACT YOUR DOCTOR IF:  You have more swelling or redness where the drain enters your skin. You feel more pain in the area of your drain. You see holes or cracks in the tubing or bulb of the drain, or the drain is leaking. The fluid removed by the MYRA drain is cloudy, yellow, or foul-smelling. The bulb will not stay flat (or compressed). SEEK CARE IMMEDIATELY IF:  You have a fever (increased body temperature) over 100.4 degrees F    The MYRA drain starts filling up very quickly with bright red blood. The MYRA drain stitches come loose or break off. Liquid has suddenly stopped coming into the bulb of your MYRA drain. Your bandages are soaked with blood. Your MYRA drain comes out.

## 2022-08-22 NOTE — OP NOTE
Operative Note      Patient: Antony Alvarez  YOB: 1975  MRN: [id number]    Date of Procedure: 8/22/2022    Pre-Op Diagnosis: Malignant neoplasm of left breast in female, estrogen receptor positive, [facility] [C50.912, Z17.0]    Post-Op Diagnosis: Same       Procedure(s):  BILATERAL BREAST MASTECTOMY/ LEFT AXILLARY SENTINEL NODE BIOPSY WITH RADIOISOTOPE  BILATERAL BREAST RECONSTRUCTION WITH STAGE 1 TISSUE EXPANDER PLACEMENT WITH ALLODERM AND CREATION OF AUTODERM FLAPS    Surgeon(s): MD Maria Alejandra Schulte MD    Assistant:   Surgical Assistant: Petey Hurst  Resident: Kaylene Lau MD    Anesthesia: General    Estimated Blood Loss (mL): Minimal    Complications: None    Specimens:   ID Type Source Tests Collected by Time Destination   A : A. Left Breast; Stiches on the tail of the breast Tissue Tissue SURGICAL PATHOLOGY Oziel Pendleton MD 8/22/2022 5098    B : B. Left Breast: New inferior margin, the ink is in the new true margin Tissue Tissue SURGICAL PATHOLOGY Oziel Pendleton MD 8/22/2022 0902    C : C. Left Axillary [place] nodes Tissue Tissue SURGICAL PATHOLOGY Oziel Pendleton MD 8/22/2022 8717        Implants:  Implant Name Type Inv.  Item Serial No.  Lot No. LRB No. Used Action   GRAFT HUM TISS THK2-2.8MM THCK L PERF CNTOUR ACELLULAR DERM - [id number]  GRAFT HUM TISS THK2-2.8MM [address] L PERF CNTOUR ACELLULAR DERM  [employer] [id number] Left 1 Implanted   GRAFT HUM TISS THK2-2.8MM [address] L PERF CNTOUR ACELLULAR DERM - [id number]  GRAFT HUM TISS THK2-2.8MM [facility] [id number] Left 1 Implanted   GRAFT HUM TISS THK2-2.8MM [address] L PERF CNTOUR ACELLULAR DERM - [id number]  GRAFT HUM TISS THK2-2.8MM [facility]  [facility] appiris [id number] Right 1 Implanted   GRAFT HUM TISS THK2-2.8MM THCK L PERF CNTOUR ACELLULAR DERM - [id number]  GRAFT HUM TISS THK2-2.8MM [facility] [id number] Right 1 Implanted         Drains:   Urinary Catheter 08/22/22 2 Way (Active)       Findings: see op note    Detailed Description of Procedure:   OPERATIVE REPORT    Name:  Darian Malhotra   MRN:  [id number]  Date:  8/22/2022        PREOPERATIVE DIAGNOSIS: left breast cancer. POSTOPERATIVE DIAGNOSIS: same    PROCEDURE: bilateral mastectomy, left axillary sentinel lymph node biopsy. ANESTHESIA: General.     SURGEON/ ASST: Iris / GUILLERMO Wellington    ESTIMATED BLOOD LOSS:  Less than 50 mL     SPECIMENS: bilateral breast tissue, left sentinel node    COMPLICATIONS: None    FINDINGS: see op note    INDICATIONS FOR PROCEDURE: The patient is a 50 y.o. female who presents with breast cancer, and she is here for bilateral mastectomy and left axillary sentinal node biopsy with immediate reconstruction for clinical stage T1N0 ductal carcinoma. Risks, benefits, and alternatives were reviewed the patient, questions were answered,   and she is agreeable to proceed. Ms. Kayode Ramirez was met by me in the preoperative area. The surgical sites were identified. Patient had been marked preoperatively by plastic surgery. Consent was obtained. The appropriate breast imaging was reviewed. DESCRIPTION OF OPERATION: The patient was brought to the operating room and  placed on the OR table in the supine position. General anesthesia was   obtained. The patient had radionucleotide injection per nuclear medicine for identification of the left axillary sentinel nodes. The bilateral breast and axillary regions were then prepped and draped in the usual sterile fashion.  A modified Aceves pattern incision was made on the left breast, carried down through the skin and subcutaneous tissues, and flaps were raised using electrocautery to remove the breast tissue superiorly up to the clavicle and   inferiorly down to the inframammary fold, and the breast was then removed off   the underlying pectoralis muscle in a medial-to-lateral direction using   electrocautery. This was carried up to the tail of the breast which was then removed, and the   tail was marked with a suture and the breast was sent to Pathology for   permanent section. The axillary fascia was divided using cautery. A gamma probe was used to identify the sentinel lymph node and this   was dissected free from the surrounding tissues, and vessels and lymphatics   were divided using the Harmonic scalpel, and the lymph node was removed and this had a count of 4499, and a second node was identified and removed in the same way and had a count of 715, and these were sent to Pathology as left axillary   sentinel lymph nodes. The residual count in the axillary region was minimal. Hemostasis was assured with cautery and the area was irrigated and packed with a moist gauze. I changed gloves and then went to the right side. A modified Aceves pattern incision was made on the left breast, carried down through the skin and subcutaneous tissues, and flaps were raised using electrocautery to remove the breast tissue superiorly up to the clavicle and   inferiorly down to the inframammary fold, and the breast was then removed off   the underlying pectoralis muscle in a medial-to-lateral direction using   electrocautery. This was carried up to the tail of the breast which was then removed, and the   tail was marked with a suture and the breast was sent to Pathology for   permanent section. Hemostasis was assured with cautery and the area was irrigated and packed with a moist gauze. The bilateral chest walls were assessed for hemostasis, and cautery was used as needed. The wound was irrigated copiously. Once hemostasis was achieved the chest wall was covered in a sterile fashion. At this point the procedure was taken over by Dr. Lady Segovia of plastic surgery to proceed with immediate reconstruction.   Please see plastic surgery note for

## 2022-08-22 NOTE — PROGRESS NOTES
Pt anxious  Medicated for nausea and pain. Encouraged pt to sleep for awhile and let the medications start to work.

## 2022-08-22 NOTE — PROGRESS NOTES
Pt more wakeful. Drinking beverage without nausea  States pain now 3/10 and wanting to progress home. States still feels drowsy, \"It takes me a lot to wake up at home too. \"

## 2022-08-22 NOTE — BRIEF OP NOTE
Brief Postoperative Note      Patient: Kat Cardona  YOB: 1975  MRN: [id number]    Date of Procedure: 8/22/2022    Pre-Op Diagnosis: Malignant neoplasm of left breast in female, estrogen receptor positive, [facility] site [facility] [C50.912, Z17.0]    Post-Op Diagnosis: Same       Procedure(s):  BILATERAL BREAST MASTECTOMY/ LEFT AXILLARY SENTINEL NODE BIOPSY WITH RADIOISOTOPE  BILATERAL BREAST RECONSTRUCTION WITH STAGE 1 TISSUE EXPANDER PLACEMENT WITH ALLODERM AND CREATION OF AUTODERM FLAPS    Surgeon(s): Delford Jeans, MD Jonda Lares, MD    Assistant:  Surgical Assistant: Michael Dutton  Resident: Adriana Monteiro MD    Anesthesia: General    Estimated Blood Loss (mL): less than 50     Complications: None    Specimens:   ID Type Source Tests Collected by Time Destination   A : A. Left Breast; Stiches on the tail of the breast Tissue Tissue SURGICAL PATHOLOGY Delford Jeans, MD 8/22/2022 3370    B : B. Left Breast: New inferior margin, the ink is in the new true margin Tissue Tissue SURGICAL PATHOLOGY Delford Jeans, MD 8/22/2022 0902    C : C. Left Axillary [place] nodes Tissue Tissue SURGICAL PATHOLOGY Delford Jeans, MD 8/22/2022 5125    D : D. Right breast; stiches on the tail of the breast Tissue Tissue SURGICAL PATHOLOGY Delford Jeans, MD 8/22/2022 1004        Implants:  Implant Name Type Inv.  Item Serial No.  Lot No. LRB No. Used Action   GRAFT HUM TISS THK2-2.8MM THCK L PERF CNTOUR ACELLULAR DERM - [id number]  GRAFT HUM TISS THK2-2.8MM [address] L PERF CNTOUR ACELLULAR DERM  [employer] [id number] Left 1 Implanted   GRAFT HUM TISS THK2-2.8MM [address] L PERF CNTOUR ACELLULAR DERM - [id number]  GRAFT HUM TISS THK2-2.8MM [facility]  [employer] [id number] Left 1 Implanted   GRAFT HUM TISS THK2-2.8MM [address] L PERF CNTOUR ACELLULAR DERM - [id number]  GRAFT HUM TISS THK2-2.8MM [address] [facility]  [employer] [facility] [employer] [id number] Right 1 Implanted   GRAFT HUM TISS THK2-2.8MM THCK L PERF CNTOUR ACELLULAR DERM - [id number]  GRAFT HUM TISS THK2-2.8MM [address] L PERF CNTOUR [employer] [id number] Right 1 Implanted         Drains:   Closed/Suction Drain Inferior; Lateral;Left Breast Bulb (Active)       Closed/Suction Drain Inferior; Lateral;Right Breast Bulb (Active)       [REMOVED] Urinary Catheter 08/22/22 2 Way (Removed)       Findings: bilateral mastectomy with stage 1 TE. Bl drain in the superior aspect of the flaps.      Electronically signed by Aung Boyer MD on 8/22/2022 at 10:50 AM

## 2022-08-22 NOTE — ANESTHESIA POSTPROCEDURE EVALUATION
Department of Anesthesiology  Postprocedure Note    Patient: Rikki Sanchez  MRN: [id number]  YOB: 1975  Date of evaluation: 8/22/2022      Procedure Summary     Date: 08/22/22 Room / Location: [facility] [address] [facility]    Anesthesia Start: 7999 Anesthesia Stop: 3691    Procedures:       BILATERAL BREAST MASTECTOMY/ LEFT AXILLARY SENTINEL NODE BIOPSY WITH RADIOISOTOPE (Bilateral)      BILATERAL BREAST RECONSTRUCTION WITH STAGE 1 TISSUE EXPANDER PLACEMENT WITH ALLODERM AND CREATION OF AUTODERM FLAPS (Bilateral) Diagnosis:       Malignant neoplasm of left breast in female, estrogen receptor positive, unspecified site of breast ([facility]      (Malignant neoplasm of left breast in female, estrogen receptor positive, unspecified site of breast ([facility] Hernandez Linares, Z17.0])    Surgeons: Gem Pisano MD; Maria Fernanda Hopkins MD Responsible Provider: Ariela Manzanares DO    Anesthesia Type: general ASA Status: 3          Anesthesia Type: No value filed.     Arnaud Phase I: Arnaud Score: 10    Arnaud Phase II:        Anesthesia Post Evaluation    Patient location during evaluation: PACU  Patient participation: complete - patient participated  Level of consciousness: awake  Pain score: 0  Airway patency: patent  Nausea & Vomiting: no nausea and no vomiting  Complications: no  Cardiovascular status: blood pressure returned to baseline  Respiratory status: acceptable  Hydration status: euvolemic  Multimodal analgesia pain management approach

## 2022-08-22 NOTE — PROGRESS NOTES
Patient awoken by RN to assess pain, patient states pain is 7/10, reeducated on pain scale as  patient falls asleep mid sentence and respirations decrease to 9/minute. Patient does not meet criteria for additional IV narcotic doses.

## 2022-08-22 NOTE — PROGRESS NOTES
Ambulatory Surgery/Procedure Discharge Note    Vitals:    08/22/22 1310   BP: 108/74   Pulse: 59   Resp: 15   Temp: (!) 96.3 °F (35.7 °C)   SpO2: 93%   Pt meets discharge criteria per Arnaud score. In: 2000 [P.O.:100; I.V.:1900]  Out: 700 [Urine:400]    Restroom use offered before discharge. Yes    Pain assessment:  present - adequately treated  Pain Level: 5 Pt states will take pain med when she gets home. Pt and S.O./family states \"ready to go home\". Pt alert and oriented x4. IV removed. Denies N/V. Surgical bra, foam and fluff gauze in place to bilateral breasts. Discharge instructions given to pt,  and sister with pt permission. Pt,  and sister verbalized understanding of all instructions. Left with all belongings, 3 prescriptions, and discharge instructions. Patient discharged to home/self care.  Patient discharged via wheel chair by transporter to waiting family/S.O.       8/22/2022 2:16 PM

## 2022-08-23 ENCOUNTER — TELEPHONE (OUTPATIENT)
Dept: SURGERY | Age: 47
End: 2022-08-23

## 2022-08-23 NOTE — TELEPHONE ENCOUNTER
Left message for Victor Manuel Turcios to call if she has any questions or concerns after procedure on 08/22/2022.

## 2022-08-24 ENCOUNTER — TELEPHONE (OUTPATIENT)
Dept: SURGERY | Age: 47
End: 2022-08-24

## 2022-08-24 DIAGNOSIS — C50.912 MALIGNANT NEOPLASM OF LEFT FEMALE BREAST, UNSPECIFIED ESTROGEN RECEPTOR STATUS, UNSPECIFIED SITE OF BREAST (HCC): Primary | ICD-10-CM

## 2022-08-24 RX ORDER — ONDANSETRON 4 MG/1
4 TABLET, ORALLY DISINTEGRATING ORAL 3 TIMES DAILY PRN
Qty: 21 TABLET | Refills: 0 | Status: SHIPPED | OUTPATIENT
Start: 2022-08-24

## 2022-08-24 NOTE — TELEPHONE ENCOUNTER
Wilber Patterson, patient's , called asking if the patient can take Prilosec for her ulcer while she is taking Colace, Keflex, and Oxycodone.     Please call: [phone]

## 2022-08-24 NOTE — TELEPHONE ENCOUNTER
Pt's sister Sudha Taveras called in saying the patient has not been able to keep anything down. They are requesting oral dissolving nausea meds to see if that will help her in getting food in. The pharmacy on file is correct, [facility] in [facility] ([phone]) and they would like it sent there. If needed, the best call back number is [phone]. Thank you!

## 2022-08-25 ENCOUNTER — TELEPHONE (OUTPATIENT)
Dept: SURGERY | Age: 47
End: 2022-08-25

## 2022-08-25 NOTE — OP NOTE
[place] [address] SURGERY     OPERATIVE DICTATION    NAME: Vini Cobb   MRN: [id number]  DATE: 08/22/2022     AGE: 50 y.o. SURGEON: Gloria Arana MD  ASSISTANT: Sarah Guzman ([id number])     BREAST SURGEON: Meek Dupree MD    PREOPERATIVE DIAGNOSIS: Acquired absence bilateral breast, status post mastectomy   POSTOPERATIVE DIAGNOSIS: Same     OPERATION: 1) Immediate bilateralstage I breast reconstruction with tissue expander placement and creation of Autoderm flaps  2) Insertion of Alloderm Acellular Dermal Matrix (328 cm^2))     ANESTHESIA: General     ESTIMATED BLOOD LOSS: 50 mL (from plastics)    OPERATIVE INDICATIONS: This is a 50 y.o. female who underwent mastectomy for breast cancer with details listed in a separate operative dictation. Options of reconstruction were discussed with the patient and she opted for a staged approach using tissue expander placement in the first stage. The plan of surgery, risks, benefits, alternatives, indications, limitations, possible complications, and future surgeries were discussed with the patient and she agreed to proceed. OPERATIVE PROCEDURE: The patient was marked in the standing position in the preoperative holding area. She was then brought to the operating room and placed in the supine position on the operating table. After satisfactory induction with general endotracheal anesthesia, the patient was then prepped and draped in the usual sterile fashion. Breast surgery commenced by performing their mastectomy. Details of the procedure are listed in a separate operative dictation. Plastic surgery then scrubbed and obtained hemostasis on the right breast initially with electrocautery. The entirety of the inferior aspect of the breasts were then de-epithelialized. While this added approximately 60  minutes to the case, it allowed for an added layer of protection for the ADM and expander.  Prepectoral reconstruction was performed by measuring the breast size after hemostasis was obtained and the wound copiously irrigated. An Exparel breast block was performed and then an expander was then placed in antibiotic solution after gloves were changed. Two sheets of Alloderm ADM were then utilized to wrap the expander using 2-0 PDS suture after being placed in antibiotic solution itself. The tabs were then marked with a marking pen and the expander was placed into the prepectoral space. The expander was secured in place using 2-0 PDS sutures at the 3, 6, & 9 o'clock tab positions. The Autoderm was then sutured into position to the Alloderm using 2-0 Vicryl sutures to allow for complete coverage and protection of the ADM / expander complex. A 15F drain was then brought out from a separate stab incision and secured in place using 3-0 Nylon suture. Approximately 600 mL of air was placed into the expander without evidence of tension on the mastectomy skin flaps. The wound was then closed in layers using 3-0 Monocryl suture. Attention was then directed toward the contralateral left side and the same procedure was performed. Prepectoral reconstruction was performed by measuring the breast size after hemostasis was obtained and the wound copiously irrigated. An Exparel breast block was performed and then an expander was then placed in antibiotic solution after gloves were changed. Two sheets of Alloderm ADM were then utilized to wrap the expander using 2-0 PDS suture after being placed in antibiotic solution itself. The tabs were then marked with a marking pen and the expander was placed into the prepectoral space. The expander was secured in place using 2-0 PDS sutures at the 3, 6, & 9 o'clock tab positions. The Autoderm was then sutured into position to the Alloderm using 2-0 Vicryl sutures to allow for complete coverage and protection of the ADM / expander complex.   A 15F drain was then brought out from a separate stab incision and secured in place using 3-0 Nylon suture. Approximately 600 mL of air was placed into the expander without evidence of tension on the mastectomy skin flaps. The wound was then closed in layers using 3-0 Monocryl suture. Sterile dressings were then applied. The patient was then awakened, extubated, and taken to the PACU in stable condition. At the end of the case, all counts were correct and there were no immediate complications. The patient tolerated the procedure well. DRAINS: 2 (15F in each breast)    SPECIMEN: None from plastics    CONDITION: Stable    MASTECTOMY SPECIMEN WEIGHT: R 761.2 grams; L 851 grams    IMPLANTS:   (Right) 600 mL Bassett Artoura High Profile, reference# [id number], lot# [id number]. (Right) Alloderm RTU, contour, perforated; size (328 cm^2); ref# [id number], lot# [id number] & [id number]. (Left)   600 mL Bassett Artoura High Profile, reference# [id number], lot# [id number]. (Left)  Alloderm RTU, contour, perforated; size (328 cm^2)); ref# [id number], lot# [id number] & [id number].     Dayami Jimenez MD

## 2022-08-29 ENCOUNTER — OFFICE VISIT (OUTPATIENT)
Dept: SURGERY | Age: 47
End: 2022-08-29

## 2022-08-29 VITALS
DIASTOLIC BLOOD PRESSURE: 75 MMHG | WEIGHT: 175 LBS | TEMPERATURE: 97.3 F | OXYGEN SATURATION: 98 % | SYSTOLIC BLOOD PRESSURE: 105 MMHG | BODY MASS INDEX: 32.53 KG/M2 | HEART RATE: 59 BPM

## 2022-08-29 DIAGNOSIS — Z09 POSTOP CHECK: Primary | ICD-10-CM

## 2022-08-29 PROCEDURE — 99024 POSTOP FOLLOW-UP VISIT: CPT

## 2022-08-31 ENCOUNTER — OFFICE VISIT (OUTPATIENT)
Dept: BREAST CENTER | Age: 47
End: 2022-08-31

## 2022-08-31 VITALS
RESPIRATION RATE: 16 BRPM | SYSTOLIC BLOOD PRESSURE: 100 MMHG | HEIGHT: 62 IN | OXYGEN SATURATION: 100 % | BODY MASS INDEX: 32.2 KG/M2 | WEIGHT: 175 LBS | HEART RATE: 56 BPM | DIASTOLIC BLOOD PRESSURE: 58 MMHG

## 2022-08-31 DIAGNOSIS — Z17.0 MALIGNANT NEOPLASM OF OVERLAPPING SITES OF LEFT BREAST IN FEMALE, ESTROGEN RECEPTOR POSITIVE (HCC): ICD-10-CM

## 2022-08-31 DIAGNOSIS — Z90.13 S/P BILATERAL MASTECTOMY: Primary | ICD-10-CM

## 2022-08-31 DIAGNOSIS — Z98.890 S/P BREAST RECONSTRUCTION, BILATERAL: ICD-10-CM

## 2022-08-31 DIAGNOSIS — C50.812 MALIGNANT NEOPLASM OF OVERLAPPING SITES OF LEFT BREAST IN FEMALE, ESTROGEN RECEPTOR POSITIVE (HCC): ICD-10-CM

## 2022-08-31 PROCEDURE — 99024 POSTOP FOLLOW-UP VISIT: CPT | Performed by: SURGERY

## 2022-08-31 NOTE — PROGRESS NOTES
Subjective:      Patient ID: Nelson Velasquez is a 50 y.o. female. HPI   Chief Complaint   Patient presents with    Post-Op Check     Patient presents today for a post op check. Her Surgery was on 8/22/2022      Patient is s/p bilateral mastectomy/left snbx/reconstruction Terie Landau) 8/22/2022 for left T1bN0 ductal carcinoma, ER/TX positive, Her2 positive. At least 4 cm of DCIS noted. Wounds healing well, seeing Dr. Robert Kaminski for drain care. She saw Dr. Mendenhall Daily who discussed chemotherapy with her. Plan for port placement. The indications for the planned procedure, along with the potential benefits and risks which include but are not limited to the risk of anesthesia, bleeding, infection, possible failed operation, lung collapse, sensation changes, nerve injury, persistent pain, and unappealing cosmetics were reviewed. The discussion I have done encompasses risks, benefits, and side effects related to the alternatives and the risks related to not receiving the proposed care, treatment, and services. All questions were answered and she agrees to proceed.        Past Medical History:   Diagnosis Date    Anxiety     Breast cancer ([address]     Depression     Migraine     PONV (postoperative nausea and vomiting)     Reflux esophagitis        Past Surgical History:   Procedure Laterality Date    ABDOMINAL EXPLORATION SURGERY      BREAST BIOPSY      2000    BREAST ENHANCEMENT SURGERY Bilateral 8/22/2022    BILATERAL BREAST RECONSTRUCTION WITH STAGE 1 TISSUE EXPANDER PLACEMENT WITH ALLODERM AND CREATION OF AUTODERM FLAPS performed by Jose Armando Knox MD at [address]      GASTRIC SLEEVE    HYSTERECTOMY (CERVIX STATUS UNKNOWN)      PATEL STEREO BREAST BX ADD LESION LEFT Left 07/13/2022    PATEL STEREO BREAST BX ADD LESION LEFT 7/13/2022 [facility] [address]    PATEL STEROTACTIC LOC BREAST BIOPSY LEFT Left 07/13/2022    PATEL STEROTACTIC LOC BREAST BIOPSY LEFT 7/13/2022 [facility] [address]    MASTECTOMY Bilateral 8/22/2022    BILATERAL BREAST MASTECTOMY/ LEFT AXILLARY SENTINEL NODE BIOPSY WITH RADIOISOTOPE performed by Maurizio Veliz MD at [address] ENDOSCOPY  07/15/2013    WITH BIOPSY AND DILITATION    UPPER GASTROINTESTINAL ENDOSCOPY N/A 05/08/2019    EGD BIOPSY performed by Dat Orozco MD at [address]         Current Outpatient Medications   Medication Sig Dispense Refill    ondansetron (ZOFRAN-ODT) 4 MG disintegrating tablet Take 1 tablet by mouth 3 times daily as needed for Nausea or Vomiting 21 tablet 0    cloNIDine (CATAPRES) 0.1 MG tablet Take 0.1 mg by mouth 2 times daily      escitalopram (LEXAPRO) 20 MG tablet Take 1 tablet by mouth daily      DEXLANSOPRAZOLE PO Dexlansoprazole Oral Delayed Release        active      cephALEXin (KEFLEX) 500 MG capsule Take 1 capsule by mouth 4 times daily for 10 days 40 capsule 0    docusate sodium (COLACE) 100 MG capsule Take 1 capsule by mouth 2 times daily for 14 days Please take while taking narcotic pain medicine. If you develop loose or watery stools, then stop taking. 30 capsule 0    mupirocin (BACTROBAN NASAL) 2 % nasal ointment Take by Nasal route 2 times daily for 5 days prior to surgery. (Patient not taking: Reported on 8/22/2022) 1 g 0    omeprazole (PRILOSEC) 40 MG delayed release capsule TAKE 1 CAPSULE BY MOUTH EVERY DAY      buPROPion HCl (WELLBUTRIN PO) Take 150 Units by mouth at bedtime      Multiple Vitamins-Minerals (ONE-A-DAY WOMENS 50+ PO) Take by mouth daily      calcium carbonate (TUMS) 500 MG chewable tablet Take 15 tablets by mouth as needed       No current facility-administered medications for this visit.        Social History     Socioeconomic History    Marital status:      Spouse name: Not on file    Number of children: Not on file    Years of education: Not on file    Highest education level: Not on file   Occupational History    Not on file   Tobacco Use    Smoking status: Never    Smokeless tobacco: Never   Vaping Use    Vaping Use: Never used   Substance and Sexual Activity    Alcohol use: Yes     Comment: SOCIALLY 1-2 MO    Drug use: Never    Sexual activity: Not on file   Other Topics Concern    Not on file   Social History Narrative    Not on file     Social Determinants of Health     Financial Resource Strain: Not on file   Food Insecurity: Not on file   Transportation Needs: Not on file   Physical Activity: Not on file   Stress: Not on file   Social Connections: Not on file   Intimate Partner Violence: Not on file   Housing Stability: Not on file       Objective:   Physical Exam        Assessment:      Diagnosis Orders   1. S/P bilateral mastectomy        2. Malignant neoplasm of overlapping sites of left breast in female, estrogen receptor positive ([facility] [address]        3.  S/P breast reconstruction, bilateral               Plan:     See above       Electronically signed by Madi Barcenas MD on 8/31/2022 at 12:34 PM

## 2022-09-01 ENCOUNTER — HOSPITAL ENCOUNTER (OUTPATIENT)
Dept: NON INVASIVE DIAGNOSTICS | Age: 47
Discharge: HOME | End: 2022-09-01
Payer: COMMERCIAL

## 2022-09-01 DIAGNOSIS — Z17.0 MALIGNANT NEOPLASM OF LEFT BREAST IN FEMALE, ESTROGEN RECEPTOR POSITIVE, UNSPECIFIED SITE OF BREAST (HCC): ICD-10-CM

## 2022-09-01 DIAGNOSIS — C50.912 MALIGNANT NEOPLASM OF LEFT BREAST IN FEMALE, ESTROGEN RECEPTOR POSITIVE, UNSPECIFIED SITE OF BREAST (HCC): ICD-10-CM

## 2022-09-01 DIAGNOSIS — I42.7 DILATED CARDIOMYOPATHY SECONDARY TO DRUG (HCC): ICD-10-CM

## 2022-09-01 LAB
LV EF: 55 %
LVEF MODALITY: NORMAL

## 2022-09-01 PROCEDURE — 93356 MYOCRD STRAIN IMG SPCKL TRCK: CPT

## 2022-09-06 ENCOUNTER — OFFICE VISIT (OUTPATIENT)
Dept: SURGERY | Age: 47
End: 2022-09-06

## 2022-09-06 VITALS
OXYGEN SATURATION: 98 % | WEIGHT: 168 LBS | HEART RATE: 58 BPM | SYSTOLIC BLOOD PRESSURE: 103 MMHG | HEIGHT: 62 IN | BODY MASS INDEX: 30.91 KG/M2 | TEMPERATURE: 98.3 F | DIASTOLIC BLOOD PRESSURE: 65 MMHG

## 2022-09-06 DIAGNOSIS — Z09 POSTOP CHECK: Primary | ICD-10-CM

## 2022-09-06 PROCEDURE — 99024 POSTOP FOLLOW-UP VISIT: CPT

## 2022-09-06 NOTE — PROGRESS NOTES
[facility]    PROCEDURE: 1) Immediate bilateralstage I breast reconstruction with tissue expander placement and creation of Autoderm flaps  2) Insertion of Alloderm Acellular Dermal Matrix (328 cm^2))    DATE: 8/22/22    Rikki Sanchez has been recovering well since her procedure. Pain has been well controlled with OTC pain medications. She presents today to office for bilateral drain removal.     EXAM    /65   Pulse 58   Temp 98.3 °F (36.8 °C)   Ht 5' 1.5\" (1.562 m)   Wt 168 lb (76.2 kg)   SpO2 98%   BMI 31.23 kg/m²       GEN: NAD   BREAST: Incision healing well. No hematoma/seroma. Drains serosang. IMP: 50 y. o.female s/p B TE  PLAN: Doing well. Bilateral drains removed. Will follow up next week to begin TE fill. She does not need radiation therapy but will need chemotherapy.           Esha Cruz, APRN - [address]  [phone]  09/06/22

## 2022-09-13 ENCOUNTER — OFFICE VISIT (OUTPATIENT)
Dept: SURGERY | Age: 47
End: 2022-09-13

## 2022-09-13 VITALS
HEART RATE: 58 BPM | BODY MASS INDEX: 31.23 KG/M2 | OXYGEN SATURATION: 98 % | TEMPERATURE: 97.6 F | SYSTOLIC BLOOD PRESSURE: 100 MMHG | WEIGHT: 168 LBS | DIASTOLIC BLOOD PRESSURE: 65 MMHG

## 2022-09-13 DIAGNOSIS — Z09 POSTOP CHECK: Primary | ICD-10-CM

## 2022-09-13 PROCEDURE — 99024 POSTOP FOLLOW-UP VISIT: CPT

## 2022-09-13 NOTE — PROGRESS NOTES
[facility]    PROCEDURE: 1) Immediate bilateralstage I breast reconstruction with tissue expander placement and creation of Autoderm flaps  2) Insertion of Alloderm Acellular Dermal Matrix (328 cm^2))    DATE: 8/22/22    Fabian Alvarez has been recovering satisfactorily since her procedure. Pain has been well controlled with intermittent pain medications. Patient states she fell this week but doesn't feel like she hurt anything. EXAM  /65   Pulse 58   Temp 97.6 °F (36.4 °C)   Wt 168 lb (76.2 kg)   SpO2 98%   BMI 31.23 kg/m²        GEN: NAD   BREAST: Incision healing appropriately. No hematoma/seroma. IMP: 50 y. o.female s/p B TE  PLAN: Doing well overall. A total of 200 ml of air removed from bilateral TE expanders, followed by 150 ml of sterile saline was placed. Bringing the total to 150 ml (600 ml expanders). Will follow up in 2 weeks for additional TE fill.          Doreen Lacy, APRN - [address] [facility]  [phone]  09/13/22

## 2022-09-16 ENCOUNTER — TELEPHONE (OUTPATIENT)
Dept: SURGERY | Age: 47
End: 2022-09-16

## 2022-09-16 NOTE — TELEPHONE ENCOUNTER
Patient called in to notify team of the following:    Friday the patient noticed a divot in her left breast and a lot of fluid build-up under her arm. Nothing physically leaking, just the visual change in the skin. No pain, just extremely tired. Clinical team was consulted and advised the following information be relayed to patient:    She is to be put on Malathi's schedule Monday, 9/19/22 (scheduled for 9:30 AM), and if she experiences any fever, chills, swelling of the breast, or if the breast double sin size, to go to [facility]. ER immediately. Patient was counseled on the above and confirmed that she understood.

## 2022-09-19 ENCOUNTER — OFFICE VISIT (OUTPATIENT)
Dept: SURGERY | Age: 47
End: 2022-09-19

## 2022-09-19 VITALS
WEIGHT: 168 LBS | HEART RATE: 60 BPM | TEMPERATURE: 98.1 F | DIASTOLIC BLOOD PRESSURE: 60 MMHG | OXYGEN SATURATION: 98 % | BODY MASS INDEX: 31.23 KG/M2 | SYSTOLIC BLOOD PRESSURE: 100 MMHG

## 2022-09-19 DIAGNOSIS — Z09 POSTOP CHECK: Primary | ICD-10-CM

## 2022-09-19 PROCEDURE — 99024 POSTOP FOLLOW-UP VISIT: CPT

## 2022-09-19 NOTE — PROGRESS NOTES
[address] [facility] time___0700_________        Surgery time_____0830_______    Take the following medications with a sip of water: Follow your MD/Surgeons pre-procedure instructions regarding your medications     Do not eat or drink anything after 12:00 midnight prior to your surgery. This includes water chewing gum, mints and ice chips. You may brush your teeth and gargle the morning of your surgery, but do not swallow the water     Please see your family doctor/pediatrician for a history and physical and/or concerning medications. Bring any test results/reports from your physicians office. If you are under the care of a heart doctor or specialist doctor, please be aware that you may be asked to them for clearance    You may be asked to stop blood thinners such as Coumadin, Plavix, Fragmin, Lovenox, etc., or any anti-inflammatories such as:  Aspirin, Ibuprofen, Advil, Naproxen prior to your surgery. We also ask that you stop any OTC medications such as fish oil, vitamin E, glucosamine, garlic, Multivitamins, COQ 10, etc.    We ask that you do not smoke 24 hours prior to surgery  We ask that you do not  drink any alcoholic beverages 24 hours prior to surgery     You must make arrangements for a responsible adult to take you home after your surgery. For your safety you will not be allowed to leave alone or drive yourself home. Your surgery will be cancelled if you do not have a ride home. Also for your safety, it is strongly suggested that someone stay with you the first 24 hours after your surgery. A parent or legal guardian must accompany a child scheduled for surgery and plan to stay at the hospital until the child is discharged. Please do not bring other children with you. For your comfort, please wear simple loose fitting clothing to the hospital.  Please do not bring valuables.     Do not wear any make-up or nail polish on your fingers or toes      For your safety, please do not wear any jewelry or body piercing's on the day of surgery. All jewelry must be removed. If you have dentures, they will be removed before going to operating room. For your convenience, we will provide you with a container. If you wear contact lenses or glasses, they will be removed, please bring a case for them. If you have a living will and a durable power of  for healthcare, please bring in a copy. As part of our patient safety program to minimize surgical site infections, we ask you to do the following:    Please notify your surgeon if you develop any illness between         now and the  day of your surgery. This includes a cough, cold, fever, sore throat, nausea,         or vomiting, and diarrhea, etc.   Please notify your surgeon if you experience dizziness, shortness         of breath or blurred vision between now and the time of your surgery. Do not shave your operative site 96 hours prior to surgery. For face and neck surgery, men may use an electric razor 48 hours   prior to surgery. You may shower the night before surgery or the morning of   your surgery with an antibacterial soap. You will need to bring a photo ID and insurance card    [facility] has an onsite pharmacy, would you like to utilize our pharmacy     If you will be staying overnight and use a C-pap machine, please bring   your C-pap to hospital     Our goal is to provide you with excellent care, therefore, visitors will be limited to two(2) in the room at a time so that we may focus on providing this care for you. Please contact pre-admission testing if you have any further questions. [facility] phone number:  [address] PAT fax number:  [phone]  Please note these are generalized instructions for all surgical cases, you may be provided with more specific instructions according to your surgery.     C-Difficile admission screening and protocol:       * Admitted with diarrhea? [] YES    [x]  NO     *Prior history of C-Diff. In last 3 months? [] YES    [x]  NO     *Antibiotic use in the past 6-8 weeks? []  NO    [x]  YES                 If yes, which ANTIBIOTIC AND REASON___post -op___     *Prior hospitalization or nursing home in the last month? []  YES    [x]  NO        SAFETY FIRST. .call before you fall

## 2022-09-19 NOTE — PROGRESS NOTES
[facility]    PROCEDURE: 1) Immediate bilateralstage I breast reconstruction with tissue expander placement and creation of Autoderm flaps  2) Insertion of Alloderm Acellular Dermal Matrix (328 cm^2))    DATE: 8/22/22    Thais Paulson has been recovering satisfactorily since her procedure. Pain has been well controlled with intermittent pain medications. Patient concerned with \"divot\" to the left breast following TE fill. EXAM:  /60   Pulse 60   Temp 98.1 °F (36.7 °C)   Wt 168 lb (76.2 kg)   SpO2 98%   BMI 31.23 kg/m²      GEN: NAD   BREAST: Incision healing appropriately. No hematoma/seroma. IMP: 50 y. o.female s/p B TE  PLAN: Explained to patient that this is normal process with TE expanders. As we expand this will fill in. She will follow up as scheduled.          Alfonzo Carson, APRN - [address] [facility]  [phone]  09/19/22

## 2022-09-21 ENCOUNTER — ANESTHESIA EVENT (OUTPATIENT)
Dept: OPERATING ROOM | Age: 47
End: 2022-09-21
Payer: COMMERCIAL

## 2022-09-22 ENCOUNTER — HOSPITAL ENCOUNTER (OUTPATIENT)
Age: 47
Setting detail: OUTPATIENT SURGERY
Discharge: HOME | End: 2022-09-22
Attending: SURGERY | Admitting: SURGERY
Payer: COMMERCIAL

## 2022-09-22 ENCOUNTER — APPOINTMENT (OUTPATIENT)
Dept: GENERAL RADIOLOGY | Age: 47
End: 2022-09-22
Attending: SURGERY
Payer: COMMERCIAL

## 2022-09-22 ENCOUNTER — ANESTHESIA (OUTPATIENT)
Dept: OPERATING ROOM | Age: 47
End: 2022-09-22
Payer: COMMERCIAL

## 2022-09-22 VITALS
OXYGEN SATURATION: 97 % | DIASTOLIC BLOOD PRESSURE: 85 MMHG | HEIGHT: 62 IN | RESPIRATION RATE: 18 BRPM | BODY MASS INDEX: 30.91 KG/M2 | TEMPERATURE: 97.1 F | HEART RATE: 73 BPM | SYSTOLIC BLOOD PRESSURE: 158 MMHG | WEIGHT: 168 LBS

## 2022-09-22 DIAGNOSIS — C50.812 MALIGNANT NEOPLASM OF OVERLAPPING SITES OF LEFT BREAST IN FEMALE, ESTROGEN RECEPTOR POSITIVE (HCC): Primary | ICD-10-CM

## 2022-09-22 DIAGNOSIS — Z17.0 MALIGNANT NEOPLASM OF OVERLAPPING SITES OF LEFT BREAST IN FEMALE, ESTROGEN RECEPTOR POSITIVE (HCC): Primary | ICD-10-CM

## 2022-09-22 PROCEDURE — 77001 FLUOROGUIDE FOR VEIN DEVICE: CPT

## 2022-09-22 PROCEDURE — A4217 STERILE WATER/SALINE, 500 ML: HCPCS | Performed by: SURGERY

## 2022-09-22 PROCEDURE — 2580000003 HC RX 258: Performed by: SURGERY

## 2022-09-22 PROCEDURE — 77001 FLUOROGUIDE FOR VEIN DEVICE: CPT | Performed by: SURGERY

## 2022-09-22 PROCEDURE — 6370000000 HC RX 637 (ALT 250 FOR IP): Performed by: ANESTHESIOLOGY

## 2022-09-22 PROCEDURE — 6370000000 HC RX 637 (ALT 250 FOR IP): Performed by: SURGERY

## 2022-09-22 PROCEDURE — 3600000012 HC SURGERY LEVEL 2 ADDTL 15MIN: Performed by: SURGERY

## 2022-09-22 PROCEDURE — 2500000003 HC RX 250 WO HCPCS: Performed by: SURGERY

## 2022-09-22 PROCEDURE — 6360000002 HC RX W HCPCS: Performed by: SURGERY

## 2022-09-22 PROCEDURE — 6360000002 HC RX W HCPCS: Performed by: NURSE ANESTHETIST, CERTIFIED REGISTERED

## 2022-09-22 PROCEDURE — 2580000003 HC RX 258: Performed by: ANESTHESIOLOGY

## 2022-09-22 PROCEDURE — 7100000010 HC PHASE II RECOVERY - FIRST 15 MIN: Performed by: SURGERY

## 2022-09-22 PROCEDURE — 3700000001 HC ADD 15 MINUTES (ANESTHESIA): Performed by: SURGERY

## 2022-09-22 PROCEDURE — 71045 X-RAY EXAM CHEST 1 VIEW: CPT

## 2022-09-22 PROCEDURE — 2709999900 HC NON-CHARGEABLE SUPPLY: Performed by: SURGERY

## 2022-09-22 PROCEDURE — 2500000003 HC RX 250 WO HCPCS: Performed by: NURSE ANESTHETIST, CERTIFIED REGISTERED

## 2022-09-22 PROCEDURE — C1788 PORT, INDWELLING, IMP: HCPCS | Performed by: SURGERY

## 2022-09-22 PROCEDURE — 7100000011 HC PHASE II RECOVERY - ADDTL 15 MIN: Performed by: SURGERY

## 2022-09-22 PROCEDURE — 76937 US GUIDE VASCULAR ACCESS: CPT | Performed by: SURGERY

## 2022-09-22 PROCEDURE — 7100000000 HC PACU RECOVERY - FIRST 15 MIN: Performed by: SURGERY

## 2022-09-22 PROCEDURE — 36561 INSERT TUNNELED CV CATH: CPT | Performed by: SURGERY

## 2022-09-22 PROCEDURE — 7100000001 HC PACU RECOVERY - ADDTL 15 MIN: Performed by: SURGERY

## 2022-09-22 PROCEDURE — 3700000000 HC ANESTHESIA ATTENDED CARE: Performed by: SURGERY

## 2022-09-22 PROCEDURE — 3600000002 HC SURGERY LEVEL 2 BASE: Performed by: SURGERY

## 2022-09-22 DEVICE — SMART PORT CT SINGLE TITANIUM PORT SYSTEM WITH ATTACHABLE 7.5F X 66CM SILICONE CATHETER AND 8F INTRODUCER KIT
Type: IMPLANTABLE DEVICE | Site: SUBCLAVIAN | Status: FUNCTIONAL
Brand: SMART PORT CT

## 2022-09-22 RX ORDER — SODIUM CHLORIDE 9 MG/ML
INJECTION, SOLUTION INTRAVENOUS CONTINUOUS
Status: DISCONTINUED | OUTPATIENT
Start: 2022-09-22 | End: 2022-09-22 | Stop reason: HOSPADM

## 2022-09-22 RX ORDER — DIPHENHYDRAMINE HYDROCHLORIDE 50 MG/ML
12.5 INJECTION INTRAMUSCULAR; INTRAVENOUS
Status: DISCONTINUED | OUTPATIENT
Start: 2022-09-22 | End: 2022-09-22 | Stop reason: HOSPADM

## 2022-09-22 RX ORDER — SODIUM CHLORIDE 0.9 % (FLUSH) 0.9 %
5-40 SYRINGE (ML) INJECTION PRN
Status: DISCONTINUED | OUTPATIENT
Start: 2022-09-22 | End: 2022-09-22 | Stop reason: HOSPADM

## 2022-09-22 RX ORDER — HEPARIN SODIUM (PORCINE) LOCK FLUSH IV SOLN 100 UNIT/ML 100 UNIT/ML
SOLUTION INTRAVENOUS
Status: COMPLETED | OUTPATIENT
Start: 2022-09-22 | End: 2022-09-22

## 2022-09-22 RX ORDER — SODIUM CHLORIDE 0.9 % (FLUSH) 0.9 %
5-40 SYRINGE (ML) INJECTION EVERY 12 HOURS SCHEDULED
Status: DISCONTINUED | OUTPATIENT
Start: 2022-09-22 | End: 2022-09-22 | Stop reason: HOSPADM

## 2022-09-22 RX ORDER — SODIUM CHLORIDE 9 MG/ML
INJECTION, SOLUTION INTRAVENOUS PRN
Status: DISCONTINUED | OUTPATIENT
Start: 2022-09-22 | End: 2022-09-22 | Stop reason: HOSPADM

## 2022-09-22 RX ORDER — MIDAZOLAM HYDROCHLORIDE 1 MG/ML
INJECTION INTRAMUSCULAR; INTRAVENOUS PRN
Status: DISCONTINUED | OUTPATIENT
Start: 2022-09-22 | End: 2022-09-22 | Stop reason: SDUPTHER

## 2022-09-22 RX ORDER — HALOPERIDOL 5 MG/ML
1 INJECTION INTRAMUSCULAR
Status: DISCONTINUED | OUTPATIENT
Start: 2022-09-22 | End: 2022-09-22 | Stop reason: HOSPADM

## 2022-09-22 RX ORDER — ONDANSETRON 2 MG/ML
INJECTION INTRAMUSCULAR; INTRAVENOUS PRN
Status: DISCONTINUED | OUTPATIENT
Start: 2022-09-22 | End: 2022-09-22 | Stop reason: SDUPTHER

## 2022-09-22 RX ORDER — FENTANYL CITRATE 50 UG/ML
50 INJECTION, SOLUTION INTRAMUSCULAR; INTRAVENOUS EVERY 5 MIN PRN
Status: DISCONTINUED | OUTPATIENT
Start: 2022-09-22 | End: 2022-09-22 | Stop reason: HOSPADM

## 2022-09-22 RX ORDER — PROPOFOL 10 MG/ML
INJECTION, EMULSION INTRAVENOUS CONTINUOUS PRN
Status: DISCONTINUED | OUTPATIENT
Start: 2022-09-22 | End: 2022-09-22 | Stop reason: SDUPTHER

## 2022-09-22 RX ORDER — FENTANYL CITRATE 50 UG/ML
INJECTION, SOLUTION INTRAMUSCULAR; INTRAVENOUS PRN
Status: DISCONTINUED | OUTPATIENT
Start: 2022-09-22 | End: 2022-09-22 | Stop reason: SDUPTHER

## 2022-09-22 RX ORDER — OXYCODONE HYDROCHLORIDE 5 MG/1
5 TABLET ORAL
Status: COMPLETED | OUTPATIENT
Start: 2022-09-22 | End: 2022-09-22

## 2022-09-22 RX ORDER — BUPIVACAINE HYDROCHLORIDE 5 MG/ML
INJECTION, SOLUTION EPIDURAL; INTRACAUDAL
Status: COMPLETED | OUTPATIENT
Start: 2022-09-22 | End: 2022-09-22

## 2022-09-22 RX ORDER — LORAZEPAM 2 MG/ML
0.5 INJECTION INTRAMUSCULAR
Status: DISCONTINUED | OUTPATIENT
Start: 2022-09-22 | End: 2022-09-22 | Stop reason: HOSPADM

## 2022-09-22 RX ORDER — LIDOCAINE HYDROCHLORIDE 20 MG/ML
INJECTION, SOLUTION EPIDURAL; INFILTRATION; INTRACAUDAL; PERINEURAL PRN
Status: DISCONTINUED | OUTPATIENT
Start: 2022-09-22 | End: 2022-09-22 | Stop reason: SDUPTHER

## 2022-09-22 RX ORDER — DEXAMETHASONE SODIUM PHOSPHATE 4 MG/ML
INJECTION, SOLUTION INTRA-ARTICULAR; INTRALESIONAL; INTRAMUSCULAR; INTRAVENOUS; SOFT TISSUE PRN
Status: DISCONTINUED | OUTPATIENT
Start: 2022-09-22 | End: 2022-09-22 | Stop reason: SDUPTHER

## 2022-09-22 RX ORDER — GLYCOPYRROLATE 0.2 MG/ML
INJECTION INTRAMUSCULAR; INTRAVENOUS PRN
Status: DISCONTINUED | OUTPATIENT
Start: 2022-09-22 | End: 2022-09-22 | Stop reason: SDUPTHER

## 2022-09-22 RX ORDER — ONDANSETRON 2 MG/ML
4 INJECTION INTRAMUSCULAR; INTRAVENOUS
Status: DISCONTINUED | OUTPATIENT
Start: 2022-09-22 | End: 2022-09-22 | Stop reason: HOSPADM

## 2022-09-22 RX ORDER — OXYCODONE HYDROCHLORIDE AND ACETAMINOPHEN 5; 325 MG/1; MG/1
1 TABLET ORAL EVERY 6 HOURS PRN
Qty: 5 TABLET | Refills: 0 | Status: SHIPPED | OUTPATIENT
Start: 2022-09-22 | End: 2022-09-25

## 2022-09-22 RX ORDER — MEPERIDINE HYDROCHLORIDE 25 MG/ML
12.5 INJECTION INTRAMUSCULAR; INTRAVENOUS; SUBCUTANEOUS
Status: DISCONTINUED | OUTPATIENT
Start: 2022-09-22 | End: 2022-09-22 | Stop reason: HOSPADM

## 2022-09-22 RX ORDER — ACETAMINOPHEN 325 MG/1
650 TABLET ORAL
Status: COMPLETED | OUTPATIENT
Start: 2022-09-22 | End: 2022-09-22

## 2022-09-22 RX ADMIN — FENTANYL CITRATE 25 MCG: 50 INJECTION INTRAMUSCULAR; INTRAVENOUS at 08:54

## 2022-09-22 RX ADMIN — SODIUM CHLORIDE: 9 INJECTION, SOLUTION INTRAVENOUS at 09:05

## 2022-09-22 RX ADMIN — PROPOFOL 160 MCG/KG/MIN: 10 INJECTION, EMULSION INTRAVENOUS at 08:33

## 2022-09-22 RX ADMIN — FENTANYL CITRATE 25 MCG: 50 INJECTION INTRAMUSCULAR; INTRAVENOUS at 08:43

## 2022-09-22 RX ADMIN — MIDAZOLAM 2 MG: 1 INJECTION INTRAMUSCULAR; INTRAVENOUS at 08:26

## 2022-09-22 RX ADMIN — SODIUM CHLORIDE: 9 INJECTION, SOLUTION INTRAVENOUS at 07:34

## 2022-09-22 RX ADMIN — ACETAMINOPHEN 650 MG: 325 TABLET ORAL at 07:34

## 2022-09-22 RX ADMIN — OXYCODONE 5 MG: 5 TABLET ORAL at 10:48

## 2022-09-22 RX ADMIN — FENTANYL CITRATE 50 MCG: 50 INJECTION INTRAMUSCULAR; INTRAVENOUS at 08:26

## 2022-09-22 RX ADMIN — ONDANSETRON 4 MG: 2 INJECTION INTRAMUSCULAR; INTRAVENOUS at 08:53

## 2022-09-22 RX ADMIN — GLYCOPYRROLATE 0.2 MG: 0.2 INJECTION, SOLUTION INTRAMUSCULAR; INTRAVENOUS at 08:26

## 2022-09-22 RX ADMIN — DEXAMETHASONE SODIUM PHOSPHATE 8 MG: 4 INJECTION, SOLUTION INTRAMUSCULAR; INTRAVENOUS at 08:50

## 2022-09-22 RX ADMIN — CEFAZOLIN 2000 MG: 2 INJECTION, POWDER, FOR SOLUTION INTRAMUSCULAR; INTRAVENOUS at 08:26

## 2022-09-22 RX ADMIN — LIDOCAINE HYDROCHLORIDE 100 MG: 20 INJECTION, SOLUTION EPIDURAL; INFILTRATION; INTRACAUDAL; PERINEURAL at 08:33

## 2022-09-22 ASSESSMENT — PAIN DESCRIPTION - DESCRIPTORS
DESCRIPTORS: SORE
DESCRIPTORS: SORE
DESCRIPTORS: ACHING

## 2022-09-22 ASSESSMENT — PAIN - FUNCTIONAL ASSESSMENT
PAIN_FUNCTIONAL_ASSESSMENT: NONE - DENIES PAIN
PAIN_FUNCTIONAL_ASSESSMENT: ACTIVITIES ARE NOT PREVENTED
PAIN_FUNCTIONAL_ASSESSMENT: ACTIVITIES ARE NOT PREVENTED

## 2022-09-22 ASSESSMENT — PAIN DESCRIPTION - ONSET
ONSET: ON-GOING
ONSET: ON-GOING

## 2022-09-22 ASSESSMENT — PAIN SCALES - GENERAL
PAINLEVEL_OUTOF10: 0
PAINLEVEL_OUTOF10: 5
PAINLEVEL_OUTOF10: 4
PAINLEVEL_OUTOF10: 6

## 2022-09-22 ASSESSMENT — PAIN DESCRIPTION - ORIENTATION
ORIENTATION: ANTERIOR
ORIENTATION: LEFT;ANTERIOR
ORIENTATION: LEFT;ANTERIOR

## 2022-09-22 ASSESSMENT — PAIN DESCRIPTION - PAIN TYPE
TYPE: ACUTE PAIN
TYPE: ACUTE PAIN

## 2022-09-22 ASSESSMENT — LIFESTYLE VARIABLES: SMOKING_STATUS: 0

## 2022-09-22 ASSESSMENT — PAIN DESCRIPTION - FREQUENCY
FREQUENCY: CONTINUOUS
FREQUENCY: CONTINUOUS

## 2022-09-22 ASSESSMENT — PAIN DESCRIPTION - LOCATION
LOCATION: NECK
LOCATION: HEAD
LOCATION: NECK

## 2022-09-22 NOTE — DISCHARGE INSTRUCTIONS
1.  Diet:  Regular diet as tolerated. 2.  Activity as tolerated per comfort. 3.  Incision care: May shower over dressing starting today. 4.  No lotion to surgical glue. 5.  Please call for follow-up appointment as needed [phone].   6.  Medications:  Continue your home medications. Can take Tylenol or ibuprofen or prescription pain medication. 7.  Call for temperature greater than 101 degrees F or for excessive bleeding or pain or swelling or inability to void. 8.  May take over the counter laxative or stool softeners as needed. 9.  No swimming for 2 weeks.     Office Phone Number:  [phone]

## 2022-09-22 NOTE — PROGRESS NOTES
Received from PACU. Admitted to Phase 2 care. Awake and alert, respirations easy and even. Oriented to room and surroundings. Complains of HA.

## 2022-09-22 NOTE — ANESTHESIA PRE PROCEDURE
Problem List   Diagnosis Code    Chronic GERD K21.9    Depression F32. A    Obesity, Class II, BMI 35-39.9, with comorbidity [id number]    Malignant neoplasm of left breast in female, estrogen receptor positive ([facility] [address] C50.912, Z17.0    Malignant neoplasm of overlapping sites of left breast in female, estrogen receptor positive ([facility] [address] C50.812, Z17.0    S/P bilateral mastectomy Z90.13    S/P breast reconstruction, bilateral Z98.890       Past Medical History:        Diagnosis Date    Anxiety     Breast cancer ([facility] [address]     left    Carpal tunnel syndrome on left     Depression     Migraine     PONV (postoperative nausea and vomiting)     Reflux esophagitis        Past Surgical History:        Procedure Laterality Date    ABDOMINAL EXPLORATION SURGERY      BREAST BIOPSY      2000    BREAST ENHANCEMENT SURGERY Bilateral 8/22/2022    BILATERAL BREAST RECONSTRUCTION WITH STAGE 1 TISSUE EXPANDER PLACEMENT WITH ALLODERM AND CREATION OF AUTODERM FLAPS performed by Courtney Lara MD at [facility] (CERVIX STATUS UNKNOWN)      PATEL STEREO BREAST BX ADD LESION LEFT Left 07/13/2022    PATEL STEREO BREAST BX ADD LESION LEFT 7/13/2022 [facility] [address]    PATEL STEROTACTIC LOC BREAST BIOPSY LEFT Left 07/13/2022    PATEL STEROTACTIC LOC BREAST BIOPSY LEFT 7/13/2022 [facility] Bilateral 8/22/2022    BILATERAL BREAST MASTECTOMY/ LEFT AXILLARY SENTINEL NODE BIOPSY WITH RADIOISOTOPE performed by Jun Eric MD at [address] [facility] [facility] ENDOSCOPY  07/15/2013    WITH BIOPSY AND DILITATION    UPPER GASTROINTESTINAL ENDOSCOPY N/A 05/08/2019    EGD BIOPSY performed by Azul Ansari MD at [address] [facility]         Social History:    Social History     Tobacco Use    Smoking status: Never    Smokeless tobacco: Never   Substance Use Topics    Alcohol use: Yes     Comment: SOCIALLY 1-2 MO                                Counseling given: Not Answered      Vital Signs (Current):   Vitals:    09/19/22 1021 09/22/22 0718 09/22/22 0730   BP:   135/63   Pulse:   57   Resp:   18   Temp:   97.2 °F (36.2 °C)   TempSrc:   Temporal   SpO2:   97%   Weight: 168 lb (76.2 kg) 168 lb (76.2 kg)    Height: 5' 1.5\" (1.562 m)                                                BP Readings from Last 3 Encounters:   09/22/22 135/63   09/19/22 100/60   09/13/22 100/65       NPO Status: Time of last liquid consumption: 2100                        Time of last solid consumption: 2100                        Date of last liquid consumption: 09/21/22                        Date of last solid food consumption: 09/21/22    BMI:   Wt Readings from Last 3 Encounters:   09/22/22 168 lb (76.2 kg)   09/19/22 168 lb (76.2 kg)   09/13/22 168 lb (76.2 kg)     Body mass index is 31.23 kg/m². CBC:   Lab Results   Component Value Date/Time    WBC 8.5 05/06/2019 02:41 PM    RBC 4.67 05/06/2019 02:41 PM    HGB 13.6 05/06/2019 02:41 PM    HCT 41.1 05/06/2019 02:41 PM    MCV 88.0 05/06/2019 02:41 PM    RDW 13.5 05/06/2019 02:41 PM     05/06/2019 02:41 PM       CMP: No results found for: NA, K, CL, CO2, BUN, CREATININE, GFRAA, AGRATIO, LABGLOM, GLUCOSE, GLU, PROT, CALCIUM, BILITOT, ALKPHOS, AST, ALT    POC Tests: No results for input(s): POCGLU, POCNA, POCK, POCCL, POCBUN, POCHEMO, POCHCT in the last 72 hours.     Coags:   Lab Results   Component Value Date/Time    APTT 36.7 08/22/2022 07:43 AM       HCG (If Applicable): No results found for: PREGTESTUR, PREGSERUM, HCG, HCGQUANT     ABGs: No results found for: PHART, PO2ART, VOC5HZS, EKG7TBO, BEART, Q1CAPIZG     Type & Screen (If Applicable):  No results found for: LABABO, LABRH    Drug/Infectious Status (If Applicable):  No results found for: HIV, HEPCAB    COVID-19 Screening (If Applicable): No results found for: COVID19        Anesthesia Evaluation  Patient summary reviewed   history of anesthetic complications: PONV. Airway: Mallampati: II  TM distance: >3 FB   Neck ROM: full  Mouth opening: > = 3 FB   Dental: normal exam         Pulmonary:       (-) not a current smoker                           Cardiovascular:        (-) past MI, CABG/stent and  angina          Echocardiogram reviewed         Beta Blocker:  Not on Beta Blocker      ROS comment: ECHO 8/22: EF 55%     Neuro/Psych:   (+) headaches:,    (-) seizures and CVA           GI/Hepatic/Renal:   (+) GERD:,      (-) liver disease and no renal disease       Endo/Other: Negative Endo/Other ROS   (+) malignancy/cancer (Breast Cancer). (-) diabetes mellitus               Abdominal:             Vascular: negative vascular ROS. Other Findings:           Anesthesia Plan      MAC     ASA 2       Induction: intravenous. Anesthetic plan and risks discussed with patient. Plan discussed with CRNA. This pre-anesthesia assessment may be used as a history and physical.    DOS STAFF ADDENDUM:    Pt seen and examined, chart reviewed (including anesthesia, drug and allergy history). No interval changes to history and physical examination. Anesthetic plan, risks, benefits, alternatives, and personnel involved discussed with patient. Patient verbalized an understanding and agrees to proceed.       Rosa Nicole MD  September 22, 2022  7:44 AM

## 2022-09-22 NOTE — H&P
Update History & Physical    The patient's History and Physical of September 22, 2022 was reviewed with the patient and I examined the patient. There was no change. The surgical site was confirmed by the patient and me. Plan: The risks, benefits, expected outcome, and alternative to the recommended procedure have been discussed with the patient. Patient understands and wants to proceed with the procedure.      Electronically signed by Dunia Waller MD on 9/22/2022 at 8:25 AM

## 2022-09-22 NOTE — OP NOTE
Operative Note      Patient: Adán Lorenzo  YOB: 1975  MRN: [id number]    Date of Procedure: 9/22/2022    Pre-Op Diagnosis: MALIGNANT NEOPLASM OF OVERLAPPING SITES OF LEFT BREAST IN FEMALE, ESTROGEN RECEPTOR POSITIVE    Post-Op Diagnosis: Same       Procedure(s):  PORT A CATHETER    Surgeon(s): Crystal Chiu MD    Assistant:   Surgical Assistant: Bj Palomino    Anesthesia: Monitor Anesthesia Care    Estimated Blood Loss (mL): Minimal    Complications: None    Specimens:   * No specimens in log *    Implants:  Implant Name Type Inv. Item Serial No.  Lot No. LRB No. Used Action   PORT INFUS L66CM 0.015ML 0.7ML CATH OD2.5MM ID1. 4MM INTRO - [id number]  PORT INFUS L66CM 0.015ML 0.7ML CATH OD2.5MM ID1. 4MM [employer] [id number] Left 1 Implanted         Drains:   [REMOVED] Closed/Suction Drain Inferior; Lateral;Left Breast Bulb (Removed)       [REMOVED] Closed/Suction Drain Inferior; Lateral;Right Breast Bulb (Removed)       [REMOVED] Urinary Catheter 08/22/22 2 Way (Removed)       Findings: see op note    Detailed Description of Procedure:       Operative Report      Name: Adán Lorenzo  MRN: [id number]  Date: 9/22/2022          PREOPERATIVE DIAGNOSIS: breast cancer    POSTOPERATIVE DIAGNOSIS: Same    PROCEDURE: Insertion of left Internal Jugular Port-A-Cath, ultrasound guidance. ANESTHESIA: MAC. SURGEON: Jimmie Jeffery MD    ESTIMATED BLOOD LOSS:  Less than 25 mL    SPECIMEN: None    COMPLICATIONS: None    INDICATIONS FOR PROCEDURE: The patient is a 50 y.o. female who presents   with breast cancer and requires Port-A-Cath for chemotherapy. Risks, benefits   and alternatives were reviewed with the patient, questions were answered and she is agreeable to proceed. DESCRIPTION OF OPERATION: The patient was brought to the operating room and   placed on the OR table in supine position. she was given IV sedation and placed in Trendelenburg and the left  Chest and neck were prepped and draped in the usual sterile fashion. The area beneath the left clavicle was anesthetized with 1% lidocaine. I attempted to access the subclavian vein, but was unable to do so. Using ultrasound guidance, the left  internal jugular vein was identified, and was accessed with a needle, and a wire was passed down using fluoroscopy. A transverse incision was made at the exit side of the wire and a subcutaneous pocket was created inferiorly. The catheter was threaded to the neck incision, and the catheter guide was threaded over the wire under fluoroscopy and the wire was removed and the catheter was threaded down so the tip lay at the junction   of the superior vena cava and the right atrium. The catheter was hooked to   the port and I used an 7. 5-Serbian Smart port and this was placed in the   subcutaneous pocket and sutured in place with 2-0 silk. The port returned   blood easily and was flushed with heparinized saline. The wound was closed   with a deep layer of 3-0 Vicryl, and the skin was re-approximated with 4-0 Monocryl and covered with surgical glue. The patient tolerated the procedure well. she was taken   to the recovery room in stable condition and chest x-ray was obtained   postoperatively.        Electronically signed by Raf Anne MD on 9/22/2022 at 9:20 AM             E

## 2022-09-22 NOTE — PROGRESS NOTES
Tolerating oral intake. Medicated for pain, neck pain. States HA improved. Discharge instructions given to patient and . Verbalize understanding. Assisted up to chair. Medication arrived from [employer] and given to .

## 2022-09-22 NOTE — PROGRESS NOTES
Patient admitted to PACU # 14 from OR at 0925 post PORT A CATHETER  per Dr. Wan Marcum. Attached to PACU monitoring system and report received from anesthesia provider. Patient was reported to be hemodynamically stable during procedure. Patient awake on admission and denied pain.

## 2022-09-22 NOTE — PROGRESS NOTES
PACU Transfer to [facility]    Vitals:    09/22/22 1015   BP: (!) 144/82   Pulse: 60   Resp: 12   Temp: 98 °F (36.7 °C)   SpO2: 96%         Intake/Output Summary (Last 24 hours) at 9/22/2022 1023  Last data filed at 9/22/2022 2631  Gross per 24 hour   Intake 1000 ml   Output --   Net 1000 ml       Pain assessment:  none  Pain Level: 0    Patient transferred to care of [facility] RN.    9/22/2022 10:23 AM

## 2022-09-27 ENCOUNTER — OFFICE VISIT (OUTPATIENT)
Dept: SURGERY | Age: 47
End: 2022-09-27

## 2022-09-27 VITALS
SYSTOLIC BLOOD PRESSURE: 151 MMHG | OXYGEN SATURATION: 98 % | WEIGHT: 168 LBS | HEART RATE: 78 BPM | BODY MASS INDEX: 31.23 KG/M2 | TEMPERATURE: 98.2 F | DIASTOLIC BLOOD PRESSURE: 87 MMHG

## 2022-09-27 DIAGNOSIS — Z09 POSTOP CHECK: Primary | ICD-10-CM

## 2022-09-27 PROCEDURE — 99024 POSTOP FOLLOW-UP VISIT: CPT

## 2022-09-27 NOTE — PROGRESS NOTES
[facility]    PROCEDURE: 1) Immediate bilateralstage I breast reconstruction with tissue expander placement and creation of Autoderm flaps  2) Insertion of Alloderm Acellular Dermal Matrix (328 cm^2))    DATE: 9/19/22    Genesis Hu has been recovering well since her procedure. Pain has been well controlled without pain medications. She presents today for an additional TE fill. She recently just got her port placed and will begin chemotherapy next week. EXAM    BP (!) 151/87   Pulse 78   Temp 98.2 °F (36.8 °C)   Wt 168 lb (76.2 kg)   SpO2 98%   BMI 31.23 kg/m²       GEN: NAD   BREAST: Incision healing appropriately. Small seroma on left breast.      IMP: 50 y. o.female s/p B TE  PLAN: Doing well. A total of 150 ml was placed into bilateral expanders bringing the total to 300 ml (600 ml expanders). A total of 40 ml of seroma was removed from left breast and 20 ml of seroma was removed. Will follow up in 2 weeks for additional TE fill.      Valery Goodman, APRN - [id number] [facility]  [phone]  09/27/22

## 2022-10-10 ENCOUNTER — OFFICE VISIT (OUTPATIENT)
Dept: SURGERY | Age: 47
End: 2022-10-10

## 2022-10-10 VITALS
HEART RATE: 78 BPM | OXYGEN SATURATION: 98 % | BODY MASS INDEX: 31.23 KG/M2 | TEMPERATURE: 98.1 F | DIASTOLIC BLOOD PRESSURE: 58 MMHG | SYSTOLIC BLOOD PRESSURE: 123 MMHG | WEIGHT: 168 LBS

## 2022-10-10 DIAGNOSIS — Z09 POSTOP CHECK: Primary | ICD-10-CM

## 2022-10-10 PROCEDURE — 99024 POSTOP FOLLOW-UP VISIT: CPT

## 2022-10-10 NOTE — PROGRESS NOTES
[facility]    PROCEDURE: 1) Immediate bilateralstage I breast reconstruction with tissue expander placement and creation of Autoderm flaps  2) Insertion of Alloderm Acellular Dermal Matrix (328 cm^2))    DATE: 9/19/22    Aspen Garcia has been recovering well since her procedure. Pain has been well controlled without pain medications. She presents today for an additional fill. EXAM    BP (!) 123/58   Pulse 78   Temp 98.1 °F (36.7 °C)   Wt 168 lb (76.2 kg)   SpO2 98%   BMI 31.23 kg/m²       GEN: NAD   BREAST: Incision healed completely. Some small erythema on bilateral breast.    IMP: 50 y. o.female s/p B TE  PLAN: Doing well. A total of 150 ml was placed into bilateral expanders bringing the total to 450 ml (600 ml expanders). Will follow up in 2 weeks for additional fill.      Willis Quiñones, APRN - [id number] [facility]  [phone]  10/10/22

## 2022-11-02 NOTE — PROGRESS NOTES
[facility]    PROCEDURE:  1) Immediate bilateralstage I breast reconstruction with tissue expander placement and creation of Autoderm flaps  2) Insertion of Alloderm Acellular Dermal Matrix (328 cm^2))    DATE: 9/19/22    Garett Hartman has been recovering satisfactorily since her procedure. Pain has been well controlled without pain medications. She presents today for her final TE fill. She has been undergoing chemotherapy and feeling nausea and vomiting from treatment. EXAM    BP (!) 176/103   Pulse 78   Temp 98 °F (36.7 °C)   Wt 168 lb (76.2 kg)   SpO2 98%   BMI 31.23 kg/m²       GEN: NAD   BREAST: Incision site healing appropriately. No hematoma/seroma. Some erythema lateral aspects of both breast, no warmness however. IMP: 50 y. o.female s/p B TE  PLAN:  A total of 150 ml of saline was filled today bringing the total to 600 ml (600 ml expander) in bilateral breast. Will follow up after she completes chemotherapy to discuss second stage surgery.       Meaghan Pinon, APRN - [id number] [facility]  [phone]  11/02/22

## 2022-11-03 ENCOUNTER — OFFICE VISIT (OUTPATIENT)
Dept: SURGERY | Age: 47
End: 2022-11-03

## 2022-11-03 VITALS
HEART RATE: 78 BPM | BODY MASS INDEX: 31.23 KG/M2 | WEIGHT: 168 LBS | OXYGEN SATURATION: 98 % | TEMPERATURE: 98 F | SYSTOLIC BLOOD PRESSURE: 176 MMHG | DIASTOLIC BLOOD PRESSURE: 103 MMHG

## 2022-11-03 DIAGNOSIS — Z09 POSTOP CHECK: Primary | ICD-10-CM

## 2022-11-03 PROCEDURE — 99024 POSTOP FOLLOW-UP VISIT: CPT

## 2022-12-13 ENCOUNTER — HOSPITAL ENCOUNTER (OUTPATIENT)
Dept: NON INVASIVE DIAGNOSTICS | Age: 47
Discharge: HOME | End: 2022-12-13
Payer: COMMERCIAL

## 2022-12-13 DIAGNOSIS — I42.7 DILATED CARDIOMYOPATHY SECONDARY TO DRUG (HCC): ICD-10-CM

## 2022-12-13 PROCEDURE — 93306 TTE W/DOPPLER COMPLETE: CPT

## (undated) DEVICE — BLADE ES ELASTOMERIC COAT INSUL DURABLE BEND UPTO 90DEG

## (undated) DEVICE — SUTURE PERMA-HAND SZ 2-0 L30IN NONABSORBABLE BLK L30MM FSL 679H

## (undated) DEVICE — SPONGE,LAP,18"X18",DLX,XR,ST,5/PK,40/PK: Brand: MEDLINE

## (undated) DEVICE — Device

## (undated) DEVICE — 3M™ IOBAN™ 2 ANTIMICROBIAL INCISE DRAPE 6648EZ: Brand: IOBAN™ 2

## (undated) DEVICE — SYRINGE 20ML LL S/C 50

## (undated) DEVICE — 3M™ STERI-STRIP™ COMPOUND BENZOIN TINCTURE 40 BAGS/CARTON 4 CARTONS/CASE C1544: Brand: 3M™ STERI-STRIP™

## (undated) DEVICE — APPLICATOR MEDICATED 26 CC SOLUTION HI LT ORNG CHLORAPREP

## (undated) DEVICE — PROVE COVER: Brand: UNBRANDED

## (undated) DEVICE — STERILE PVP: Brand: MEDLINE INDUSTRIES, INC.

## (undated) DEVICE — CLEANER,CAUTERY TIP,2X2",STERILE: Brand: MEDLINE

## (undated) DEVICE — SOLUTION IV 1000ML 0.9% SOD CHL PH 5 INJ USP VIAFLX PLAS

## (undated) DEVICE — GAUZE,SPONGE,2"X2",8PLY,STERILE,LF,2'S: Brand: MEDLINE

## (undated) DEVICE — SUTURE PERMA-HAND SZ 2-0 L30IN NONABSORBABLE BLK L26MM SH K833H

## (undated) DEVICE — TEXTURED, HIGH PROFILE, SUTURE TABS, INTEGRAL INJECTION DOME, 600CC: Brand: ARTOURA BREAST TISSUE EXPANDER

## (undated) DEVICE — COVER,MAYO STAND,XL,STERILE: Brand: MEDLINE

## (undated) DEVICE — 3M™ TEGADERM™ TRANSPARENT FILM DRESSING FRAME STYLE, 1626W, 4 IN X 4-3/4 IN (10 CM X 12 CM), 50/CT 4CT/CASE: Brand: 3M™ TEGADERM™

## (undated) DEVICE — GAUZE FLUFF 1 PLY: Brand: DEROYAL

## (undated) DEVICE — COVER US PRB W13XL244CM SURGICAL INTRAOPERATIVE W RUBBERBAND

## (undated) DEVICE — 3M™ STERI-DRAPE™ INCISE DRAPE 1050 (60CM X 45CM): Brand: STERI-DRAPE™

## (undated) DEVICE — STRIP,CLOSURE,WOUND,MEDI-STRIP,1/2X4: Brand: MEDLINE

## (undated) DEVICE — PLASTIC MAJOR: Brand: MEDLINE INDUSTRIES, INC.

## (undated) DEVICE — AEGIS 1" DISK 4MM HOLE, PEEL OPEN: Brand: MEDLINE

## (undated) DEVICE — SYRINGE TB 1ML NDL 27GA L0.5IN PLAS W/ SFTY LOK PERM NDL

## (undated) DEVICE — DRESSING FOAM 0.75IN 1.5MM H DISK CHG IMPREG AEGIS

## (undated) DEVICE — 450 ML BOTTLE OF 0.05% CHLORHEXIDINE GLUCONATE IN 99.95% STERILE WATER FOR IRRIGATION, USP AND APPLICATOR.: Brand: IRRISEPT ANTIMICROBIAL WOUND LAVAGE

## (undated) DEVICE — SYRINGE MED 10ML LUERLOCK TIP W/O SFTY DISP

## (undated) DEVICE — Z DISCONTINUED USE 2272114 DRAPE SURG UTIL 26X15 IN W/ TAPE N INVASIVE MULTLYR DISP

## (undated) DEVICE — DECANTER BAG 9": Brand: MEDLINE INDUSTRIES, INC.

## (undated) DEVICE — SUTURE VCRL SZ 3-0 L18IN ABSRB UD L26MM SH 1/2 CIR J864D

## (undated) DEVICE — GLOVE ORANGE PI 7   MSG9070

## (undated) DEVICE — SUTURE MCRYL SZ 3-0 L27IN ABSRB UD L19MM PS-2 3/8 CIR PRIM Y427H

## (undated) DEVICE — DRESSING GERM DIA1IN CNTR H DIA7MM BLU CHG ANTIMIC PROTCT

## (undated) DEVICE — TOWEL,STOP FLAG GOLD N-W: Brand: MEDLINE

## (undated) DEVICE — 3M™ IOBAN™ 2 ANTIMICROBIAL INCISE DRAPE 6640EZ: Brand: IOBAN™ 2

## (undated) DEVICE — MINOR BREAST: Brand: MEDLINE INDUSTRIES, INC.

## (undated) DEVICE — 3M™ TEGADERM™ TRANSPARENT FILM DRESSING FRAME STYLE, 1624W, 2-3/8 IN X 2-3/4 IN (6 CM X 7 CM), 100/CT 4CT/CASE: Brand: 3M™ TEGADERM™

## (undated) DEVICE — SYSTEM SKIN CLSR 22CM DERMBND PRINEO

## (undated) DEVICE — FORCEPS BX 240CM 2.4MM L NDL RAD JAW 4 M00513334

## (undated) DEVICE — ADHESIVE SKIN CLOSURE TOP 36 CC HI VISC DERMBND MINI

## (undated) DEVICE — DRAIN SURG 15FR L3 16IN DIA47MM SIL RND HUBLESS FULL FLUT

## (undated) DEVICE — SYRINGE IRRIG 60ML SFT PLIABLE BLB EZ TO GRP 1 HND USE W/

## (undated) DEVICE — PENCIL ELECSURG HND CTRL ALL IN 1 MONOPOLAR LAP

## (undated) DEVICE — SUTURE PDS II SZ 2-0 L27IN ABSRB VLT L26MM CT-2 1/2 CIR Z333H

## (undated) DEVICE — GLOVE SURG SZ 75 L12IN FNGR THK79MIL GRN LTX FREE

## (undated) DEVICE — TUBING, SUCTION, 9/32" X 12', STRAIGHT: Brand: MEDLINE INDUSTRIES, INC.

## (undated) DEVICE — C-ARM: Brand: UNBRANDED

## (undated) DEVICE — GLOVE SURG SZ 7 L12IN FNGR THK79MIL GRN LTX FREE

## (undated) DEVICE — INTENDED FOR TISSUE SEPARATION, AND OTHER PROCEDURES THAT REQUIRE A SHARP SURGICAL BLADE TO PUNCTURE OR CUT.: Brand: BARD-PARKER ® STAINLESS STEEL BLADES

## (undated) DEVICE — SUTURE VCRL + SZ 3-0 L27IN ABSRB UD L26MM SH 1/2 CIR VCP416H

## (undated) DEVICE — PAD FOAM 11.75X7-7/8 IN RESTON LF

## (undated) DEVICE — SUTURE MCRYL + SZ 4-0 L18IN ABSRB UD L19MM PS-2 3/8 CIR MCP496G

## (undated) DEVICE — FLUID TRAP FOR MINIVAC ES EQUIP FLD TRAP

## (undated) DEVICE — DRAPE,T,LAPARO,TRANS,STERILE: Brand: MEDLINE

## (undated) DEVICE — SUTURE MCRYL SZ 4-0 L27IN ABSRB UD L19MM PS-2 1/2 CIR PRIM Y426H

## (undated) DEVICE — GOWN,SIRUS,POLYRNF,BRTHSLV,XLN/XL,20/CS: Brand: MEDLINE

## (undated) DEVICE — DRAPE,CHEST,FENES,15X10,STERIL: Brand: MEDLINE

## (undated) DEVICE — SYRINGE MED 5ML STD CLR PLAS N CTRL SLIP TIP DISP

## (undated) DEVICE — GLOVE ORANGE PI 8 1/2   MSG9085

## (undated) DEVICE — GLOVE ORANGE PI 7 1/2   MSG9075

## (undated) DEVICE — SUTURE PDS II SZ 2-0 L27IN ABSRB UD FS-1 L24MM 3/8 CIR REV Z443H

## (undated) DEVICE — Z DISCONTINUED NEEDLE HYPO 22GA L1.5IN BLK POLYPR HUB S STL REG BVL STR - SEE COMMENT

## (undated) DEVICE — MINOR SET UP: Brand: MEDLINE INDUSTRIES, INC.

## (undated) DEVICE — PLASMABLADE PS210-030S 3.0S LOCK: Brand: PLASMABLADE™

## (undated) DEVICE — SHEET,DRAPE,40X58,STERILE: Brand: MEDLINE